# Patient Record
Sex: MALE | Race: WHITE | NOT HISPANIC OR LATINO | Employment: FULL TIME | URBAN - METROPOLITAN AREA
[De-identification: names, ages, dates, MRNs, and addresses within clinical notes are randomized per-mention and may not be internally consistent; named-entity substitution may affect disease eponyms.]

---

## 2018-08-13 ENCOUNTER — TELEPHONE (OUTPATIENT)
Dept: DERMATOLOGY | Facility: CLINIC | Age: 28
End: 2018-08-13

## 2018-08-13 NOTE — TELEPHONE ENCOUNTER
Dermatology Pre-visit Call:    Reason for visit : Eczema and rash     Any personal history of skin cancers: No    Was the patient referred: No    If the patient was referred, are records obtained: No. (If no, then obtain records).    Has the patient seen a dermatologist in the past: Yes. (If yes, obtain records)     Patient Reminders Given:  --Please, make sure you bring an updated list of your medications.   --Plan on being in our facility for approximately one hour, this includes the registration process, office visit, education and check-out process.  If you are having a procedure, more time may be required.     --If you are having a procedure, please, present 15 minutes early.  --Location reviewed.   --If you need to cancel or reschedule, call [phone]  --We look forward to seeing you in Dermatology Clinic.

## 2018-08-20 ENCOUNTER — OFFICE VISIT (OUTPATIENT)
Dept: DERMATOLOGY | Facility: CLINIC | Age: 28
End: 2018-08-20
Payer: COMMERCIAL

## 2018-08-20 DIAGNOSIS — L30.1 DYSHIDROTIC ECZEMA: ICD-10-CM

## 2018-08-20 DIAGNOSIS — L73.8 BACTERIAL FOLLICULITIS: Primary | ICD-10-CM

## 2018-08-20 RX ORDER — CLINDAMYCIN PHOSPHATE 10 UG/ML
LOTION TOPICAL DAILY
Qty: 60 ML | Refills: 3 | Status: SHIPPED | OUTPATIENT
Start: 2018-08-20 | End: 2018-10-08

## 2018-08-20 ASSESSMENT — PAIN SCALES - GENERAL: PAINLEVEL: NO PAIN (0)

## 2018-08-20 NOTE — LETTER
8/20/2018       RE: Johnson Ayon  [address]  [place] [facility] [address]     Dear Colleague,    Thank you for referring your patient, Johnson Ayon, to the [facility] DERMATOLOGY at [facility]. Please see a copy of my visit note below.    [facility] Dermatology Note      Dermatology Problem List:  1. Dyshidrotic eczema, hands bilaterally  - bleach baths 2-3 times per week, emollients    2. Folliculitis, upper chest and upper back  - bleach baths 2-3 times per week, clindamycin 1% lotion daily    Encounter Date: Aug 20, 2018    CC:  Chief Complaint   Patient presents with     Eczema     Johnson is here today for Eczema all over his body.        History of Present Illness:  Mr. Johnson Ayon is a 27 year old male who presents in self referral for eczema.    The patient reports that he has always had dry skin. Since moving here from [place] 10 years ago, the frankel have been more hard on his skin. Since age 19, he has had red bumps across the chest and upper back. In the past few years, the rash has been worsening on his wrists and hands with dry open patches. He has been diagnosed with atopic dermatitis, seasonal allergies, and asthma, and this also runs in his family.     The rash is very itchy. His skin care regimen is showering twice daily with Aveeno body wash and uses E45 (from the [place]) head to toe afterwards as an emollient. Also CeraVe hand cream for the hands. He has tried topical corticosteroids in the past for flares but tries to be judicious; last use was last winter. He has a 3 month old son at home and recently moved back from the [place] (lived there for 2 years). Johnson denies a personal or family history of skin cancer. He last saw a dermatologist a few years ago.    Past Medical History:   There is no problem list on file for this patient.    No past medical history on file.  No past surgical history on file.    Social  History:  Social History     Social History     Marital status:      Spouse name: N/A     Number of children: N/A     Years of education: N/A     Social History Main Topics     Smoking status: Not on file     Smokeless tobacco: Not on file     Alcohol use Not on file     Drug use: Not on file     Sexual activity: Not on file     Other Topics Concern     Not on file     Social History Narrative       Family History:  No family history on file.    Medications:  No current outpatient prescriptions on file.     Allergies   Allergen Reactions     Peanuts [Nuts] GI Disturbance   Review of Systems:  -As per HPI  -Constitutional: The patient denies fatigue, fevers, chills, unintended weight loss, and night sweats.  -HEENT: Patient denies nonhealing oral sores.  -Skin: As above in HPI. No additional skin concerns.    Physical exam:  Vitals: There were no vitals taken for this visit.  GEN: This is a well developed, well-nourished male in no acute distress, in a pleasant mood.    SKIN: Total skin excluding the undergarment areas was performed. The exam included the head/face, neck, both arms, chest, back, abdomen, both legs, digits and/or nails.   - Excoriated follicularly pink papules on chest and upper back  - Eczematous scaly papules on dorsal hands   - Vesicles on lateral fingers  - No other lesions of concern on areas examined    Impression/Plan:  1. Atopic dermatitis and dyshidrotic eczema, hands bilaterally    Counseled patient on diagnosis, etiology, disease course, and treatment options    Treatment options include light therapy with NB-UVB, topical corticosteroids, dupilumab, cyclosporine, methotrexate, and azathioprine    Start bleach baths 2-3 times per week    Bring old corticosteroids from home to follow up appointment    2. Folliculitis, upper chest and upper back    Discussed that it is a tricky balance between washing off sweat and keeping the folliculitis under control vs not drying out the skin and  exacerbating his atopic dermatitis    Counseled patient on diagnosis, etiology, disease course, and treatment options    Treatment options include bleach baths and benzoyl peroxide wash    Start bleach baths 2-3 times per week    Start clindamycin 1% lotion daily    Follow-up in 3 months, earlier for new or changing lesions.     Staff Involved:  Scribed by Sol Mckenzie, MS4 for Dr. Brown.    .I was present with the medical student who participated in the service and in the documentation of the note. I have verified the history and personally performed the physical exam and medical decision making. I agree with the assessment and plan of care as documented in the note.      Again, thank you for allowing me to participate in the care of your patient.      Sincerely,    Genevieve Brown MD

## 2018-08-20 NOTE — MR AVS SNAPSHOT
"              After Visit Summary   8/20/2018    Johnson Ayon    MRN: [id number]           Patient Information     Date Of Birth          1990        Visit Information        Provider Department      8/20/2018 8:15 AM Genevieve Brown MD [facility] Dermatology        Today's Diagnoses     Bacterial folliculitis    -  1    Dyshidrotic eczema          Care Instructions      Start bleach baths 2-3 times per week (instructions below)    Start clindamycin 1% lotion daily    Bring corticosteroids from home to follow up appointment    Follow up in 3 months      What are dilute bleach baths?  Dilute bleach baths are used to help fight bacteria that is commonly found on the skin; this bacteria may be preventing your skin from healing. If is also used to calm inflammation in skin, even if infection is not present. The dilution ratio we recommend is the same concentration that is in a swimming pool.     Type;  *Regular, plain household bleach used for cleaning clothing. Brand or Generic is okay.   *Make sure this is plain or concentrated bleach. This should NOT be \"splash free, splash less or color safe.\"   *There should not be any added fragrance to the bleach; such a lavender.    How do I make a dilute bleach bath?  *Fill your tub with lukewarm water with at least 4-6 inches of water.  *Pour 1/4 to 1/2 cup of bleach into an adult size bath tub.  *For smaller tubs (infant tubs), add two tablespoons of bleach to the tub water. * Bleach baths work better if your child is able to submerge most of their skin, so consider placing the infant tub in the larger tub.   *Repeat bleach baths as recommended by your provider.    Other information:  *Do not pour bleach directly onto the skin.  *If is safe to get the bleach mixture on your face and scalp.  *Do not drink the bleach mixture.  *Keep bleach bottle out of reach of children.              Follow-ups after your visit        Follow-up notes from your care team     " Return in about 3 months (around 2018) for eczema and folliculitis follow up.      Your next 10 appointments already scheduled     2018 10:00 AM CST   (Arrive by 9:45 AM)   Return Visit with Genevieve Brown MD   [facility] Dermatology ([facility])    [address] [address] [phone]              Who to contact     Please call your clinic at [phone] to:    Ask questions about your health    Make or cancel appointments    Discuss your medicines    Learn about your test results    Speak to your doctor            Additional Information About Your Visit        MyChart Information     Ref[employer]rlhart is an electronic gateway that provides easy, online access to your medical records. With MODLOFTt, you can request a clinic appointment, read your test results, renew a prescription or communicate with your care team.     To sign up for MODLOFTt visit the website at [email or web address]   You will be asked to enter the access code listed below, as well as some personal information. Please follow the directions to create your username and password.     Your access code is: [email or web address]  Expires: 2018  6:30 AM     Your access code will  in 90 days. If you need help or a new code, please contact your [facility] or call [phone] for assistance.        Care EveryWhere ID     This is your Care EveryWhere ID. This could be used by other organizations to access your [facility] medical records  [id number]         Blood Pressure from Last 3 Encounters:   No data found for BP    Weight from Last 3 Encounters:   No data found for Wt              Today, you had the following     No orders found for display       Primary Care Provider Fax #    Physician No Ref-Primary [phone]       No address on file        Equal Access to Services     ZACH SANCHEZ : linda Venegas qaybta  callie gómezbruno gracia ah. So [facility] [phone].    ATENCIÓN: Si habla jenn, tiene a arnold disposición servicios gratuitos de asistencia lingüística. Llame al [phone].    We comply with applicable federal civil rights laws and [place] laws. We do not discriminate on the basis of race, color, national origin, age, disability, sex, sexual orientation, or gender identity.            Thank you!     Thank you for choosing [facility] DERMATOLOGY  for your care. Our goal is always to provide you with excellent care. Hearing back from our patients is one way we can continue to improve our services. Please take a few minutes to complete the written survey that you may receive in the mail after your visit with us. Thank you!             Your Updated Medication List - Protect others around you: Learn how to safely use, store and throw away your medicines at www.disposemymeds.org.          This list is accurate as of 8/20/18  8:46 AM.  Always use your most recent med list.                   Brand Name Dispense Instructions for use Diagnosis    ALBUTEROL IN

## 2018-08-20 NOTE — PROGRESS NOTES
[facility] Dermatology Note      Dermatology Problem List:  1. Dyshidrotic eczema, hands bilaterally  - bleach baths 2-3 times per week, emollients    2. Folliculitis, upper chest and upper back  - bleach baths 2-3 times per week, clindamycin 1% lotion daily    Encounter Date: Aug 20, 2018    CC:  Chief Complaint   Patient presents with     Eczema     Johnson is here today for Eczema all over his body.        History of Present Illness:  Mr. Johnson Ayon is a 27 year old male who presents in self referral for eczema.    The patient reports that he has always had dry skin. Since moving here from [place] 10 years ago, the [place] have been more hard on his skin. Since age 19, he has had red bumps across the chest and upper back. In the past few years, the rash has been worsening on his wrists and hands with dry open patches. He has been diagnosed with atopic dermatitis, seasonal allergies, and asthma, and this also runs in his family.     The rash is very itchy. His skin care regimen is showering twice daily with Aveeno body wash and uses E45 (from the [place]) head to toe afterwards as an emollient. Also CeraVe hand cream for the hands. He has tried topical corticosteroids in the past for flares but tries to be judicious; last use was last winter. He has a 3 month old son at home and recently moved back from the  (lived there for 2 years). Johnson denies a personal or family history of skin cancer. He last saw a dermatologist a few years ago.    Past Medical History:   There is no problem list on file for this patient.    No past medical history on file.  No past surgical history on file.    Social History:  Social History     Social History     Marital status:      Spouse name: N/A     Number of children: N/A     Years of education: N/A     Social History Main Topics     Smoking status: Not on file     Smokeless tobacco: Not on file     Alcohol use Not on file     Drug use: Not on file      Sexual activity: Not on file     Other Topics Concern     Not on file     Social History Narrative       Family History:  No family history on file.    Medications:  No current outpatient prescriptions on file.     Allergies   Allergen Reactions     Peanuts [Nuts] GI Disturbance         Review of Systems:  -As per HPI  -Constitutional: The patient denies fatigue, fevers, chills, unintended weight loss, and night sweats.  -HEENT: Patient denies nonhealing oral sores.  -Skin: As above in HPI. No additional skin concerns.    Physical exam:  Vitals: There were no vitals taken for this visit.  GEN: This is a well developed, well-nourished male in no acute distress, in a pleasant mood.    SKIN: Total skin excluding the undergarment areas was performed. The exam included the head/face, neck, both arms, chest, back, abdomen, both legs, digits and/or nails.   - Excoriated follicularly pink papules on chest and upper back  - Eczematous scaly papules on dorsal hands   - Vesicles on lateral fingers  - No other lesions of concern on areas examined    Impression/Plan:  1. Atopic dermatitis and dyshidrotic eczema, hands bilaterally    Counseled patient on diagnosis, etiology, disease course, and treatment options    Treatment options include light therapy with NB-UVB, topical corticosteroids, dupilumab, cyclosporine, methotrexate, and azathioprine    Start bleach baths 2-3 times per week    Bring old corticosteroids from home to follow up appointment    2. Folliculitis, upper chest and upper back    Discussed that it is a tricky balance between washing off sweat and keeping the folliculitis under control vs not drying out the skin and exacerbating his atopic dermatitis    Counseled patient on diagnosis, etiology, disease course, and treatment options    Treatment options include bleach baths and benzoyl peroxide wash    Start bleach baths 2-3 times per week    Start clindamycin 1% lotion daily      Follow-up in 3 months,  earlier for new or changing lesions.     Staff Involved:  Scribed by Sol Mckenzie, MS4 for Dr. Brown.    .I was present with the medical student who participated in the service and in the documentation of the note. I have verified the history and personally performed the physical exam and medical decision making. I agree with the assessment and plan of care as documented in the note.  Genevieve Brown MD

## 2018-08-20 NOTE — PATIENT INSTRUCTIONS
"  Start bleach baths 2-3 times per week (instructions below)    Start clindamycin 1% lotion daily    Bring corticosteroids from home to follow up appointment    Follow up in 3 months      What are dilute bleach baths?  Dilute bleach baths are used to help fight bacteria that is commonly found on the skin; this bacteria may be preventing your skin from healing. If is also used to calm inflammation in skin, even if infection is not present. The dilution ratio we recommend is the same concentration that is in a swimming pool.     Type;  *Regular, plain household bleach used for cleaning clothing. Brand or Generic is okay.   *Make sure this is plain or concentrated bleach. This should NOT be \"splash free, splash less or color safe.\"   *There should not be any added fragrance to the bleach; such a lavender.    How do I make a dilute bleach bath?  *Fill your tub with lukewarm water with at least 4-6 inches of water.  *Pour 1/4 to 1/2 cup of bleach into an adult size bath tub.  *For smaller tubs (infant tubs), add two tablespoons of bleach to the tub water. * Bleach baths work better if your child is able to submerge most of their skin, so consider placing the infant tub in the larger tub.   *Repeat bleach baths as recommended by your provider.    Other information:  *Do not pour bleach directly onto the skin.  *If is safe to get the bleach mixture on your face and scalp.  *Do not drink the bleach mixture.  *Keep bleach bottle out of reach of children.      "

## 2018-08-20 NOTE — NURSING NOTE
Dermatology Rooming Note    Johnson Ayon's goals for this visit include:   Chief Complaint   Patient presents with     Eczema     Johnson is here today for Eczema all over his body.      MARLEE Carver

## 2018-10-08 ENCOUNTER — OFFICE VISIT (OUTPATIENT)
Dept: FAMILY MEDICINE | Facility: CLINIC | Age: 28
End: 2018-10-08
Payer: COMMERCIAL

## 2018-10-08 VITALS
RESPIRATION RATE: 16 BRPM | DIASTOLIC BLOOD PRESSURE: 74 MMHG | BODY MASS INDEX: 30.92 KG/M2 | OXYGEN SATURATION: 97 % | WEIGHT: 216 LBS | HEIGHT: 70 IN | SYSTOLIC BLOOD PRESSURE: 136 MMHG | TEMPERATURE: 98.1 F | HEART RATE: 66 BPM

## 2018-10-08 DIAGNOSIS — Z23 NEED FOR VACCINATION: ICD-10-CM

## 2018-10-08 DIAGNOSIS — J45.40 MODERATE PERSISTENT ASTHMA WITHOUT COMPLICATION: Primary | ICD-10-CM

## 2018-10-08 PROCEDURE — 99203 OFFICE O/P NEW LOW 30 MIN: CPT | Mod: 25 | Performed by: FAMILY MEDICINE

## 2018-10-08 PROCEDURE — 90686 IIV4 VACC NO PRSV 0.5 ML IM: CPT | Performed by: FAMILY MEDICINE

## 2018-10-08 PROCEDURE — 90471 IMMUNIZATION ADMIN: CPT | Performed by: FAMILY MEDICINE

## 2018-10-08 RX ORDER — FLUTICASONE PROPIONATE AND SALMETEROL 113; 14 UG/1; UG/1
1 POWDER, METERED RESPIRATORY (INHALATION) 2 TIMES DAILY
Qty: 3 INHALER | Refills: 3 | Status: SHIPPED | OUTPATIENT
Start: 2018-10-08 | End: 2018-12-31

## 2018-10-08 RX ORDER — CLINDAMYCIN PHOSPHATE 10 UG/ML
LOTION TOPICAL 2 TIMES DAILY
COMMUNITY
End: 2018-12-18

## 2018-10-08 RX ORDER — ALBUTEROL SULFATE 90 UG/1
2 AEROSOL, METERED RESPIRATORY (INHALATION) EVERY 6 HOURS PRN
Qty: 1 INHALER | Refills: 1 | Status: SHIPPED | OUTPATIENT
Start: 2018-10-08 | End: 2018-12-31

## 2018-10-08 NOTE — LETTER
My Asthma Action Plan  Name: Johnson Ayon   YOB: 1990  Date: 10/8/2018   My doctor: Bob Emanuel MD   My clinic: [facility]        My Control Medicine: airduo  My Rescue Medicine: Albuterol (Proair/Ventolin/Proventil) inhaler prn   My Asthma Severity: moderate persistent  Avoid your asthma triggers: upper respiratory infections  dust mites            GREEN ZONE   Good Control    I feel good    No cough or wheeze    Can work, sleep and play without asthma symptoms       Take your asthma control medicine every day.     1. If exercise triggers your asthma, take your rescue medication    15 minutes before exercise or sports, and    During exercise if you have asthma symptoms  2. Spacer to use with inhaler: If you have a spacer, make sure to use it with your inhaler             YELLOW ZONE Getting Worse  I have ANY of these:    I do not feel good    Cough or wheeze    Chest feels tight    Wake up at night   1. Keep taking your Green Zone medications  2. Start taking your rescue medicine:    every 20 minutes for up to 1 hour. Then every 4 hours for 24-48 hours.  3. If you stay in the Yellow Zone for more than 12-24 hours, contact your doctor.  4. If you do not return to the Green Zone in 12-24 hours or you get worse, start taking your oral steroid medicine if prescribed by your provider.           RED ZONE Medical Alert - Get Help  I have ANY of these:    I feel awful    Medicine is not helping    Breathing getting harder    Trouble walking or talking    Nose opens wide to breathe       1. Take your rescue medicine NOW  2. If your provider has prescribed an oral steroid medicine, start taking it NOW  3. Call your doctor NOW  4. If you are still in the Red Zone after 20 minutes and you have not reached your doctor:    Take your rescue medicine again and    Call 911 or go to the emergency room right away    See your regular doctor within 2 weeks of an Emergency Room or Urgent  Care visit for follow-up treatment.          Annual Reminders:  Meet with Asthma Educator,  Flu Shot in the Fall, consider Pneumonia Vaccination for patients with asthma (aged 19 and older).    Pharmacy: [facility]Y [id number] [place] [phone] [place]                      Asthma Triggers  How To Control Things That Make Your Asthma Worse    Triggers are things that make your asthma worse.  Look at the list below to help you find your triggers and what you can do about them.  You can help prevent asthma flare-ups by staying away from your triggers.      Trigger                                                          What you can do   Cigarette Smoke  Tobacco smoke can make asthma worse. Do not allow smoking in your home, car or around you.  Be sure no one smokes at a child s day care or school.  If you smoke, ask your health care provider for ways to help you quit.  Ask family members to quit too.  Ask your health care provider for a referral to Quit Plan to help you quit smoking, or call 5-269-343-PLAN.     Colds, Flu, Bronchitis  These are common triggers of asthma. Wash your hands often.  Don t touch your eyes, nose or mouth.  Get a flu shot every year.     Dust Mites  These are tiny bugs that live in cloth or carpet. They are too small to see. Wash sheets and blankets in hot water every week.   Encase pillows and mattress in dust mite proof covers.  Avoid having carpet if you can. If you have carpet, vacuum weekly.   Use a dust mask and HEPA vacuum.   Pollen and Outdoor Mold  Some people are allergic to trees, grass, or weed pollen, or molds. Try to keep your windows closed.  Limit time out doors when pollen count is high.   Ask you health care provider about taking medicine during allergy season.     Animal Dander  Some people are allergic to skin flakes, urine or saliva from pets with fur or feathers. Keep pets with fur or feathers out of your home.    If you can t keep the pet outdoors, then keep the pet  out of your bedroom.  Keep the bedroom door closed.  Keep pets off cloth furniture and away from stuffed toys.     Mice, Rats, and Cockroaches  Some people are allergic to the waste from these pests.   Cover food and garbage.  Clean up spills and food crumbs.  Store grease in the refrigerator.   Keep food out of the bedroom.   Indoor Mold  This can be a trigger if your home has high moisture. Fix leaking faucets, pipes, or other sources of water.   Clean moldy surfaces.  Dehumidify basement if it is damp and smelly.   Smoke, Strong Odors, and Sprays  These can reduce air quality. Stay away from strong odors and sprays, such as perfume, powder, hair spray, paints, smoke incense, paint, cleaning products, candles and new carpet.   Exercise or Sports  Some people with asthma have this trigger. Be active!  Ask your doctor about taking medicine before sports or exercise to prevent symptoms.    Warm up for 5-10 minutes before and after sports or exercise.     Other Triggers of Asthma  Cold air:  Cover your nose and mouth with a scarf.  Sometimes laughing or crying can be a trigger.  Some medicines and food can trigger asthma.

## 2018-10-08 NOTE — NURSING NOTE
Screening Questionnaire for Adult Immunization    Are you sick today?   No   Do you have allergies to medications, food, a vaccine component or latex?   No   Have you ever had a serious reaction after receiving a vaccination?   No   Do you have a long-term health problem with heart disease, lung disease, asthma, kidney disease, metabolic disease (e.g. diabetes), anemia, or other blood disorder?   No   Do you have cancer, leukemia, HIV/AIDS, or any other immune system problem?   No   In the past 3 months, have you taken medications that affect  your immune system, such as prednisone, other steroids, or anticancer drugs; drugs for the treatment of rheumatoid arthritis, Crohn s disease, or psoriasis; or have you had radiation treatments?   No   Have you had a seizure, or a brain or other nervous system problem?   No   During the past year, have you received a transfusion of blood or blood     products, or been given immune (gamma) globulin or antiviral drug?   No   For women: Are you pregnant or is there a chance you could become        pregnant during the next month?   No   Have you received any vaccinations in the past 4 weeks?   No     Immunization questionnaire answers were all negative.      Prior to injection verified patient identity using patient's name and date of birth.  Due to injection administration, patient instructed to remain in clinic for 15 minutes  afterwards, and to report any adverse reaction to me immediately.      Per orders of Dr. Emanuel, injection of Flu given by Francine Daugherty. Patient instructed to remain in clinic for 15 minutes afterwards, and to report any adverse reaction to me immediately.       Screening performed by Francine Daugherty on 10/8/2018 at 10:56 AM.

## 2018-10-08 NOTE — PROGRESS NOTES
"  SUBJECTIVE:   Johnson Ayon is a 28 year old male who presents to clinic today for the following health issues:      Asthma Follow-Up    Was ACT completed today?  Yes  No flowsheet data found.    Recent asthma triggers that patient is dealing with: dust mites, pollen, and weather          Amount of exercise or physical activity: 6-7 days/week for an average of 30-45 minutes    Problems taking medications regularly: No    Medication side effects: none    Diet: no peanut products     Was on advir for years  Was in the [place] for 2 years and on an alternative inh steroid but ran out    Now has a cold and symptoms worsening    See ACT    ROS: As per HPI.  Constitutional: no recent illness, no fevers/sweats/chills  CV: no palpitations, no chest pain, no lower extremity swelling.  Resp: + wheezing, or cough.    I have reviewed and updated the patient's past medical history in the EMR. Current problems are:    There is no problem list on file for this patient.    Past Surgical History:   Procedure Laterality Date     SINUS SURGERY  2007       Social History   Substance Use Topics     Smoking status: Never Smoker     Smokeless tobacco: Never Used     Alcohol use Yes      Comment: moderate      Family History   Problem Relation Age of Onset     Asthma Father      Asthma Brother      Asthma Sister      Family History Negative Mother      Melanoma No family hx of      Skin Cancer No family hx of          Allergies, reviewed:     Allergies   Allergen Reactions     Peanuts [Nuts] GI Disturbance       Current Outpatient Prescriptions   Medication Sig Dispense Refill     clindamycin (CLEOCIN T) 1 % lotion Apply topically 2 times daily       fluticasone-salmeterol (ADVAIR) 100-50 MCG/DOSE diskus inhaler Inhale 1 puff into the lungs every 12 hours         Objective:  BP (!) 155/99  Pulse 66  Temp 98.1  F (36.7  C) (Oral)  Resp 16  Ht 5' 10\" (1.778 m)  Wt 216 lb (98 kg)  SpO2 97%  BMI 30.99 kg/m2  General Appearance: " Pleasant, alert, WN/WD in no acute respiratory distress.  Head Exam: Normal. Normocephalic, atraumatic.    Eye Exam: Normal external eye, conjunctiva, lids. KEV.  Ear Exam: Normal auditory canals and external ears.    OroPharynx Exam: Dental hygiene adequate.    Neck Exam: Supple   Chest/Respiratory Exam: wheezing through with cough  Cardiovascular Exam: Regular rate and rhythm. No murmur, gallop, or rubs. No pedal edema.  Skin: no rash, warm and dry.  Neurologic Exam: Nonfocal, no tremor. Normal gait.  Psychiatric Exam: Alert - appropriate, normal affect    ASSESSMENT/PLAN:    ICD-10-CM    1. Moderate persistent asthma without complication J45.40 fluticasone-salmeterol (AIRDUO RESPICLICK) 113-14 MCG/ACT inhaler     albuterol (PROAIR HFA/PROVENTIL HFA/VENTOLIN HFA) 108 (90 Base) MCG/ACT inhaler   2. Need for vaccination Z23 HC FLU VAC PRESRV FREE QUAD SPLIT VIR 3+YRS IM      New patient establish care for asthma, mild exacerbation due to losing medication coverage  Start with airduo  Follow up: Return as needed if symptoms fail to improve     Else in 1 year    Bob Emanuel MD MPH

## 2018-10-08 NOTE — MR AVS SNAPSHOT
After Visit Summary   10/8/2018    Johnson Ayon    MRN: [id number]           Patient Information     Date Of Birth          1990        Visit Information        Provider Department      10/8/2018 10:30 AM Bob Emanuel MD [facility]        Today's Diagnoses     Moderate persistent asthma without complication    -  1    Need for vaccination           Follow-ups after your visit        Follow-up notes from your care team     Return in about 1 year (around 10/8/2019), or if symptoms worsen or fail to improve.      Your next 10 appointments already scheduled     Nov 13, 2018 10:00 AM CST   (Arrive by 9:45 AM)   Return Visit with Genevieve Brown MD   [facility] ([facility])    [address] [place] [id number] [phone]              Who to contact     If you have questions or need follow up information about today's clinic visit or your schedule please contact [facility] directly at [phone].  Normal or non-critical lab and imaging results will be communicated to you by MyChart, letter or phone within 4 business days after the clinic has received the results. If you do not hear from us within 7 days, please contact the clinic through [employer] or phone. If you have a critical or abnormal lab result, we will notify you by phone as soon as possible.  Submit refill requests through [employer] or call your pharmacy and they will forward the refill request to us. Please allow 3 business days for your refill to be completed.          Additional Information About Your Visit        MyChart Information     [employer] gives you secure access to your electronic health record. If you see a primary care provider, you can also send messages to your care team and make appointments. If you have questions, please call your primary care clinic.  If you do not have a primary care provider, please call  "[phone] and they will assist you.        Care EveryWhere ID     This is your Care EveryWhere ID. This could be used by other organizations to access your [place] medical records  [id number]        Your Vitals Were     Pulse Temperature Respirations Height Pulse Oximetry BMI (Body Mass Index)    66 98.1  F (36.7  C) (Oral) 16 5' 10\" (1.778 m) 97% 30.99 kg/m2       Blood Pressure from Last 3 Encounters:   10/08/18 136/74    Weight from Last 3 Encounters:   10/08/18 216 lb (98 kg)              We Performed the Following     HC FLU VAC PRESRV FREE QUAD SPLIT VIR 3+YRS IM     VACCINE ADMINISTRATION, INITIAL          Today's Medication Changes          These changes are accurate as of 10/8/18 11:13 AM.  If you have any questions, ask your nurse or doctor.               Start taking these medicines.        Dose/Directions    albuterol 108 (90 Base) MCG/ACT inhaler   Commonly known as:  PROAIR HFA/PROVENTIL HFA/VENTOLIN HFA   Used for:  Moderate persistent asthma without complication   Started by:  Bob Emanuel MD        Dose:  2 puff   Inhale 2 puffs into the lungs every 6 hours as needed for shortness of breath / dyspnea or wheezing   Quantity:  1 Inhaler   Refills:  1       fluticasone-salmeterol 113-14 MCG/ACT inhaler   Commonly known as:  AIRDUO RESPICLICK   Used for:  Moderate persistent asthma without complication   Replaces:  fluticasone-salmeterol 100-50 MCG/DOSE diskus inhaler   Started by:  Bob Emanuel MD        Dose:  1 puff   Inhale 1 puff into the lungs 2 times daily   Quantity:  3 Inhaler   Refills:  3         These medicines have changed or have updated prescriptions.        Dose/Directions    clindamycin 1 % lotion   Commonly known as:  CLEOCIN T   This may have changed:  Another medication with the same name was removed. Continue taking this medication, and follow the directions you see here.   Changed by:  Bob Emanuel MD        Apply topically 2 times daily "   Refills:  0         Stop taking these medicines if you haven't already. Please contact your care team if you have questions.     ALBUTEROL IN   Stopped by:  Bob Emanuel MD           fluticasone-salmeterol 100-50 MCG/DOSE diskus inhaler   Commonly known as:  ADVAIR   Replaced by:  fluticasone-salmeterol 113-14 MCG/ACT inhaler   Stopped by:  Bob Emanuel MD                Where to get your medicines      These medications were sent to [facility]/pharmacy #[id number] - [place], [place] - [address]  [address] [facility] [address]     Phone:  [phone]     albuterol 108 (90 Base) MCG/ACT inhaler    fluticasone-salmeterol 113-14 MCG/ACT inhaler                Primary Care Provider Office Phone # Fax #    [facility] [phone] [address]  [facility] [address]        Equal Access to Services     [facility]: Hadii aad ku hadasho Soomaali, waaxda luqadaha, qaybta kaalmada adeegyada, waxay idiin haymonican dontae gracia . So [facility] [phone].    ATENCIÓN: Si habla español, tiene a arnold disposición servicios gratuitos de asistencia lingüística. Llame al [phone].    We comply with applicable federal civil rights laws and [place] laws. We do not discriminate on the basis of race, color, national origin, age, disability, sex, sexual orientation, or gender identity.            Thank you!     Thank you for choosing [facility]  for your care. Our goal is always to provide you with excellent care. Hearing back from our patients is one way we can continue to improve our services. Please take a few minutes to complete the written survey that you may receive in the mail after your visit with us. Thank you!             Your Updated Medication List - Protect others around you: Learn how to safely use, store and throw away your medicines at www.disposemymeds.org.          This list is accurate as of 10/8/18 11:13 AM.  Always use your most recent  med list.                   Brand Name Dispense Instructions for use Diagnosis    albuterol 108 (90 Base) MCG/ACT inhaler    PROAIR HFA/PROVENTIL HFA/VENTOLIN HFA    1 Inhaler    Inhale 2 puffs into the lungs every 6 hours as needed for shortness of breath / dyspnea or wheezing    Moderate persistent asthma without complication       clindamycin 1 % lotion    CLEOCIN T     Apply topically 2 times daily        fluticasone-salmeterol 113-14 MCG/ACT inhaler    AIRDUO RESPICLICK    3 Inhaler    Inhale 1 puff into the lungs 2 times daily    Moderate persistent asthma without complication

## 2018-10-08 NOTE — NURSING NOTE
"Chief Complaint   Patient presents with     Asthma     BP (!) 155/99  Pulse 66  Temp 98.1  F (36.7  C) (Oral)  Resp 16  Ht 5' 10\" (1.778 m)  Wt 216 lb (98 kg)  SpO2 97%  BMI 30.99 kg/m2 Estimated body mass index is 30.99 kg/(m^2) as calculated from the following:    Height as of this encounter: 5' 10\" (1.778 m).    Weight as of this encounter: 216 lb (98 kg).  bp completed using cuff size: regular       Health Maintenance addressed:  BP was high, used pink card, recheck manually    Possibly completing today per provider review.    Francine Daugherty MA     "

## 2018-11-13 ENCOUNTER — OFFICE VISIT (OUTPATIENT)
Dept: DERMATOLOGY | Facility: CLINIC | Age: 28
End: 2018-11-13
Payer: COMMERCIAL

## 2018-11-13 VITALS — SYSTOLIC BLOOD PRESSURE: 144 MMHG | DIASTOLIC BLOOD PRESSURE: 70 MMHG | HEART RATE: 60 BPM

## 2018-11-13 DIAGNOSIS — L73.9 FOLLICULITIS: ICD-10-CM

## 2018-11-13 DIAGNOSIS — L30.1 DYSHIDROTIC ECZEMA: Primary | ICD-10-CM

## 2018-11-13 ASSESSMENT — PAIN SCALES - GENERAL: PAINLEVEL: NO PAIN (0)

## 2018-11-13 NOTE — PROGRESS NOTES
[facility] Dermatology Note      Dermatology Problem List:  1. Dyshidrotic eczema  - Emollients, OTC hydrocortisone  - Patient to call/MyChart if worsening, would prescribe triamcinolone ointment  2. Folliculitis, upper chest and upper back  - Benzoyl peroxide wash, clindamycin 1% lotion daily    Encounter Date: Nov 13, 2018    CC:  Chief Complaint   Patient presents with     Derm Problem     Maria Guadalupe is here for a eczema follow up, states things have been improving.         History of Present Illness:  Mr. Johnson Ayon is a 28 year old male who presents in follow up for dyshidrotic hand dermatitis and folliculitis of the upper chest and upper back. He was last seen 8/20/18, at which time he was continued on dry skin care/emollients for his hand eczema and bleach baths and clindamycin 1% lotion were recommended for his folliculitis.   Today, patient reports that his hand eczema is almost completely clear. Using gentle cleanser and moisturizing frequently. For occasional flares, he uses OTC hydrocortisone 1% cream, which works well. He does not want a stronger steroid prescription at this time, but will call if he would like that in the future.   He also reports that his folliculitis is still present, but significantly improved. The bleach baths worked very well, however he recently moved to a new place that doesn't have a bathtub so he wonders if there are any alternatives. He is using the clindamycin 1% lotion daily with good results as well.   No new concerns today; denies any new, growing, changing, or symptomatic lesions.     Past Medical History:   Patient Active Problem List   Diagnosis     Moderate persistent asthma without complication     No past medical history on file.  Past Surgical History:   Procedure Laterality Date     SINUS SURGERY  2007       Social History:  Social History     Social History     Marital status:      Spouse name: N/A     Number of children: N/A      Years of education: N/A     Social History Main Topics     Smoking status: Not on file     Smokeless tobacco: Not on file     Alcohol use Not on file     Drug use: Not on file     Sexual activity: Not on file     Other Topics Concern     Not on file     Social History Narrative       Family History:  Family History   Problem Relation Age of Onset     Asthma Father      Asthma Brother      Asthma Sister      Family History Negative Mother      Melanoma No family hx of      Skin Cancer No family hx of        Medications:  Current Outpatient Prescriptions   Medication Sig Dispense Refill     albuterol (PROAIR HFA/PROVENTIL HFA/VENTOLIN HFA) 108 (90 Base) MCG/ACT inhaler Inhale 2 puffs into the lungs every 6 hours as needed for shortness of breath / dyspnea or wheezing 1 Inhaler 1     clindamycin (CLEOCIN T) 1 % lotion Apply topically 2 times daily       fluticasone-salmeterol (AIRDUO RESPICLICK) 113-14 MCG/ACT inhaler Inhale 1 puff into the lungs 2 times daily 3 Inhaler 3     Allergies   Allergen Reactions     Peanuts [Nuts] GI Disturbance         Review of Systems:  -General: Otherwise feeling well, in baseline state of general health.   -Skin: As above in HPI. No additional skin concerns.    Physical exam:  Vitals: /70 (BP Location: Right arm, Patient Position: Chair, Cuff Size: Adult Large)  Pulse 60  GEN: This is a well-developed, well-nourished male in no acute distress, in a pleasant mood.    SKIN: Focused examination of the chest, back, and bilateral distal arms/hands was performed:  - Wrists with limited, very thin, poorly demarcated eczematous plaques. Fingers are clear.   - Back and chest with scattered follicular pink papules - improved today.   - No other lesions of concern on areas examined    Impression/Plan:  1. Dyshidrotic hand eczema - well controlled with only dry skin care/emollients.     Continue dry skin care and frequent moisturization.     Using OTC hydrocortisone 1% as needed for flares  with good results.    Declined a prescription for a stronger steroid to have on hand to use as needed today, but states he will call or send a [employer] message if he would like this in the future. Will send triamcinolone 0.1% oint if requested.    2. Folliculitis, upper chest and upper back- significantly improved today.     Start benzoyl peroxide 5-10% wash a few times weekly as tolerated since he can no longer do bleach baths. This will also help prevent clindamycin resistance.     Continue clindamycin 1% lotion daily.     Follow-up in 6 months, earlier for new or changing lesions.     The patient was staffed with Dr. Brown.     Staff Involved:  Resident (Jodi Leblanc MD)/Staff(as above).  .I, Genevieve Brown MD, saw this patient with the resident and agree with the resident s findings and plan of care as documented in the resident s note.

## 2018-11-13 NOTE — LETTER
11/13/2018       RE: Johnson Ayon  [address]  [facility] [address]     Dear Colleague,    Thank you for referring your patient, Johnson Aoyn, to the [facility] DERMATOLOGY at [facility]. Please see a copy of my visit note below.    [facility] Dermatology Note      Dermatology Problem List:  1. Dyshidrotic eczema  - Emollients, OTC hydrocortisone  - Patient to call/MyChart if worsening, would prescribe triamcinolone ointment  2. Folliculitis, upper chest and upper back  - Benzoyl peroxide wash, clindamycin 1% lotion daily    Encounter Date: Nov 13, 2018    CC:  Chief Complaint   Patient presents with     Derm Problem     Maria Guadalupe is here for a eczema follow up, states things have been improving.         History of Present Illness:  Mr. Johnson Ayon is a 28 year old male who presents in follow up for dyshidrotic hand dermatitis and folliculitis of the upper chest and upper back. He was last seen 8/20/18, at which time he was continued on dry skin care/emollients for his hand eczema and bleach baths and clindamycin 1% lotion were recommended for his folliculitis.   Today, patient reports that his hand eczema is almost completely clear. Using gentle cleanser and moisturizing frequently. For occasional flares, he uses OTC hydrocortisone 1% cream, which works well. He does not want a stronger steroid prescription at this time, but will call if he would like that in the future.   He also reports that his folliculitis is still present, but significantly improved. The bleach baths worked very well, however he recently moved to a new place that doesn't have a bathtub so he wonders if there are any alternatives. He is using the clindamycin 1% lotion daily with good results as well.   No new concerns today; denies any new, growing, changing, or symptomatic lesions.     Past Medical History:   Patient Active Problem List   Diagnosis      Moderate persistent asthma without complication     No past medical history on file.  Past Surgical History:   Procedure Laterality Date     SINUS SURGERY  2007       Social History:  Social History     Social History     Marital status:      Spouse name: N/A     Number of children: N/A     Years of education: N/A     Social History Main Topics     Smoking status: Not on file     Smokeless tobacco: Not on file     Alcohol use Not on file     Drug use: Not on file     Sexual activity: Not on file     Other Topics Concern     Not on file     Social History Narrative       Family History:  Family History   Problem Relation Age of Onset     Asthma Father      Asthma Brother      Asthma Sister      Family History Negative Mother      Melanoma No family hx of      Skin Cancer No family hx of        Medications:  Current Outpatient Prescriptions   Medication Sig Dispense Refill     albuterol (PROAIR HFA/PROVENTIL HFA/VENTOLIN HFA) 108 (90 Base) MCG/ACT inhaler Inhale 2 puffs into the lungs every 6 hours as needed for shortness of breath / dyspnea or wheezing 1 Inhaler 1     clindamycin (CLEOCIN T) 1 % lotion Apply topically 2 times daily       fluticasone-salmeterol (AIRDUO RESPICLICK) 113-14 MCG/ACT inhaler Inhale 1 puff into the lungs 2 times daily 3 Inhaler 3     Allergies   Allergen Reactions     Peanuts [Nuts] GI Disturbance         Review of Systems:  -General: Otherwise feeling well, in baseline state of general health.   -Skin: As above in HPI. No additional skin concerns.    Physical exam:  Vitals: /70 (BP Location: Right arm, Patient Position: Chair, Cuff Size: Adult Large)  Pulse 60  GEN: This is a well-developed, well-nourished male in no acute distress, in a pleasant mood.    SKIN: Focused examination of the chest, back, and bilateral distal arms/hands was performed:  - Wrists with limited, very thin, poorly demarcated eczematous plaques. Fingers are clear.   - Back and chest with scattered  follicular pink papules - improved today.   - No other lesions of concern on areas examined    Impression/Plan:  1. Dyshidrotic hand eczema - well controlled with only dry skin care/emollients.     Continue dry skin care and frequent moisturization.     Using OTC hydrocortisone 1% as needed for flares with good results.    Declined a prescription for a stronger steroid to have on hand to use as needed today, but states he will call or send a [employer] message if he would like this in the future. Will send triamcinolone 0.1% oint if requested.    2. Folliculitis, upper chest and upper back- significantly improved today.     Start benzoyl peroxide 5-10% wash a few times weekly as tolerated since he can no longer do bleach baths. This will also help prevent clindamycin resistance.     Continue clindamycin 1% lotion daily.     Follow-up in 6 months, earlier for new or changing lesions.     The patient was staffed with Dr. Brown.     Staff Involved:  Resident (Jodi Leblanc MD)/Staff(as above).  .I, Genevieve Brown MD, saw this patient with the resident and agree with the resident s findings and plan of care as documented in the resident s note.      Again, thank you for allowing me to participate in the care of your patient.      Sincerely,    Genevieve Brown MD

## 2018-11-13 NOTE — PATIENT INSTRUCTIONS
Continue dry skin care for your hand/wrist eczema - gentle soaps and moisturizers as you have been doing. You can use over the counter hydrocortisone if needed for worsening. Please call us if you are having a severe flare or if that is not working, and we can prescribe a stronger steroid medication called triamcinolone.     For your folliculitis, continue clindamycin lotion daily. Since you can no longer take bleach baths, you should start using benzoyl peroxide wash 5-10% a few times per week as a body wash to the affected areas (leave on for a few minutes) to prevent clindamycin resistance. This is found over the counter. This can bleach linens, so be careful to use an older or white towel.     You can try Curel hydratherapy moisturizer in the shower if you like.     Return to clinic in 6 months, sooner as needed for worsening/concerns.

## 2018-11-13 NOTE — LETTER
Date:November 14, 2018      Patient was self referred, no letter generated. Do not send.        [facility] Information

## 2018-11-13 NOTE — MR AVS SNAPSHOT
After Visit Summary   11/13/2018    Johnson Ayon    MRN: [id number]           Patient Information     Date Of Birth          1990        Visit Information        Provider Department      11/13/2018 10:00 AM Genevieve Brown MD [facility] Dermatology        Care Instructions    Continue dry skin care for your hand/wrist eczema - gentle soaps and moisturizers as you have been doing. You can use over the counter hydrocortisone if needed for worsening. Please call us if you are having a severe flare or if that is not working, and we can prescribe a stronger steroid medication called triamcinolone.     For your folliculitis, continue clindamycin lotion daily. Since you can no longer take bleach baths, you should start using benzoyl peroxide wash 5-10% a few times per week as a body wash to the affected areas (leave on for a few minutes) to prevent clindamycin resistance. This is found over the counter. This can bleach linens, so be careful to use an older or white towel.     You can try Curel hydratherapy moisturizer in the shower if you like.     Return to clinic in 6 months, sooner as needed for worsening/concerns.           Follow-ups after your visit        Follow-up notes from your care team     Return in about 6 months (around 5/13/2019).      Who to contact     Please call your clinic at [phone] to:    Ask questions about your health    Make or cancel appointments    Discuss your medicines    Learn about your test results    Speak to your doctor            Additional Information About Your Visit        MyChart Information     [employer] gives you secure access to your electronic health record. If you see a primary care provider, you can also send messages to your care team and make appointments. If you have questions, please call your primary care clinic.  If you do not have a primary care provider, please call [phone] and they will assist you.      [employer] is an electronic  gateway that provides easy, online access to your medical records. With [employer], you can request a clinic appointment, read your test results, renew a prescription or communicate with your care team.     To access your existing account, please contact your [facility] or call [phone] for assistance.        Care EveryWhere ID     This is your Care EveryWhere ID. This could be used by other organizations to access your [facility] medical records  [id number]        Your Vitals Were     Pulse                   60            Blood Pressure from Last 3 Encounters:   11/13/18 144/70   10/08/18 136/74    Weight from Last 3 Encounters:   10/08/18 98 kg (216 lb)              Today, you had the following     No orders found for display       Primary Care Provider Office Phone # Fax #    [facility] [phone] [address]  [facility] [address]        Equal Access to Services     [facility] : Hadii aad ku hadasho Soomaali, waaxda luqadaha, qaybta kaalmada adeegyada, waxay idiin hayaan dontae gracia . So [facility] [phone].    ATENCIÓN: Si habla español, tiene a arnold disposición servicios gratuitos de asistencia lingüística. Llame al [phone].    We comply with applicable federal civil rights laws and [place] laws. We do not discriminate on the basis of race, color, national origin, age, disability, sex, sexual orientation, or gender identity.            Thank you!     Thank you for choosing [facility] DERMATOLOGY  for your care. Our goal is always to provide you with excellent care. Hearing back from our patients is one way we can continue to improve our services. Please take a few minutes to complete the written survey that you may receive in the mail after your visit with us. Thank you!             Your Updated Medication List - Protect others around you: Learn how to safely use, store and throw away your medicines at  www.disposemymeds.org.          This list is accurate as of 11/13/18 10:30 AM.  Always use your most recent med list.                   Brand Name Dispense Instructions for use Diagnosis    albuterol 108 (90 Base) MCG/ACT inhaler    PROAIR HFA/PROVENTIL HFA/VENTOLIN HFA    1 Inhaler    Inhale 2 puffs into the lungs every 6 hours as needed for shortness of breath / dyspnea or wheezing    Moderate persistent asthma without complication       clindamycin 1 % lotion    CLEOCIN T     Apply topically 2 times daily        fluticasone-salmeterol 113-14 MCG/ACT inhaler    AIRDUO RESPICLICK    3 Inhaler    Inhale 1 puff into the lungs 2 times daily    Moderate persistent asthma without complication

## 2018-11-13 NOTE — NURSING NOTE
Dermatology Rooming Note    Johnson Ayon's goals for this visit include:   Chief Complaint   Patient presents with     Derm Problem     Maria Guadalupe is here for a eczema follow up, states things have been improving.         Henrietta Shin LPN

## 2018-11-14 ENCOUNTER — MYC REFILL (OUTPATIENT)
Dept: FAMILY MEDICINE | Facility: CLINIC | Age: 28
End: 2018-11-14

## 2018-11-14 DIAGNOSIS — J45.40 MODERATE PERSISTENT ASTHMA WITHOUT COMPLICATION: ICD-10-CM

## 2018-11-14 RX ORDER — ALBUTEROL SULFATE 90 UG/1
2 AEROSOL, METERED RESPIRATORY (INHALATION) EVERY 6 HOURS PRN
Qty: 1 INHALER | Refills: 1 | Status: CANCELLED | OUTPATIENT
Start: 2018-11-14

## 2018-11-14 RX ORDER — FLUTICASONE PROPIONATE AND SALMETEROL 113; 14 UG/1; UG/1
1 POWDER, METERED RESPIRATORY (INHALATION) 2 TIMES DAILY
Qty: 3 INHALER | Refills: 3 | Status: CANCELLED | OUTPATIENT
Start: 2018-11-14

## 2018-11-14 NOTE — TELEPHONE ENCOUNTER
Message from MyChart:  Original authorizing provider: Bob Emanuel MD    Johnson GUILLERMOJeffrey McfarlandGabo would like a refill of the following medications:  fluticasone-salmeterol (AIRDUO RESPICLICK) 113-14 MCG/ACT inhaler [Bob Emanuel MD]  albuterol (PROAIR HFA/PROVENTIL HFA/VENTOLIN HFA) 108 (90 Base) MCG/ACT inhaler [Bob Emanuel MD]    Preferred pharmacy: [id number] - [place], [place] - [address]    Comment:

## 2018-11-14 NOTE — TELEPHONE ENCOUNTER
"Should have refills at different [facility]  See Ang MOCTEZUMA RN    Requested Prescriptions   Pending Prescriptions Disp Refills     fluticasone-salmeterol (AIRDUO RESPICLICK) 113-14 MCG/ACT inhaler 3 Inhaler 3     Sig: Inhale 1 puff into the lungs 2 times daily    Inhaled Steroids Protocol Failed    11/14/2018 11:41 AM       Failed - Asthma control assessment score within normal limits in last 6 months    Please review ACT score.          Passed - Patient is age 12 or older       Passed - Recent (6 mo) or future (30 days) visit within the authorizing provider's specialty    Patient had office visit in the last 6 months or has a visit in the next 30 days with authorizing provider or within the authorizing provider's specialty.  See \"Patient Info\" tab in inbasket, or \"Choose Columns\" in Meds & Orders section of the refill encounter.            albuterol (PROAIR HFA/PROVENTIL HFA/VENTOLIN HFA) 108 (90 Base) MCG/ACT inhaler 1 Inhaler 1     Sig: Inhale 2 puffs into the lungs every 6 hours as needed for shortness of breath / dyspnea or wheezing    Asthma Maintenance Inhalers - Anticholinergics Failed    11/14/2018 11:41 AM       Failed - Asthma control assessment score within normal limits in last 6 months    Please review ACT score.          Passed - Patient is age 12 years or older       Passed - Recent (6 mo) or future (30 days) visit within the authorizing provider's specialty    Patient had office visit in the last 6 months or has a visit in the next 30 days with authorizing provider or within the authorizing provider's specialty.  See \"Patient Info\" tab in inbasket, or \"Choose Columns\" in Meds & Orders section of the refill encounter.                "

## 2018-12-18 ENCOUNTER — OFFICE VISIT (OUTPATIENT)
Dept: FAMILY MEDICINE | Facility: CLINIC | Age: 28
End: 2018-12-18
Payer: COMMERCIAL

## 2018-12-18 ENCOUNTER — MYC REFILL (OUTPATIENT)
Dept: FAMILY MEDICINE | Facility: CLINIC | Age: 28
End: 2018-12-18

## 2018-12-18 VITALS
SYSTOLIC BLOOD PRESSURE: 156 MMHG | BODY MASS INDEX: 31.42 KG/M2 | OXYGEN SATURATION: 97 % | DIASTOLIC BLOOD PRESSURE: 69 MMHG | HEIGHT: 70 IN | HEART RATE: 82 BPM | RESPIRATION RATE: 16 BRPM | TEMPERATURE: 98.3 F | WEIGHT: 219.44 LBS

## 2018-12-18 DIAGNOSIS — F32.0 MILD MAJOR DEPRESSION (H): ICD-10-CM

## 2018-12-18 DIAGNOSIS — F32.0 MILD MAJOR DEPRESSION (H): Primary | ICD-10-CM

## 2018-12-18 PROCEDURE — 99214 OFFICE O/P EST MOD 30 MIN: CPT | Performed by: FAMILY MEDICINE

## 2018-12-18 RX ORDER — FLUOXETINE 10 MG/1
10 CAPSULE ORAL DAILY
Qty: 60 CAPSULE | Refills: 1 | Status: CANCELLED | OUTPATIENT
Start: 2018-12-18

## 2018-12-18 RX ORDER — FLUOXETINE 10 MG/1
10 CAPSULE ORAL DAILY
Qty: 60 CAPSULE | Refills: 1 | Status: SHIPPED | OUTPATIENT
Start: 2018-12-18 | End: 2020-11-20

## 2018-12-18 ASSESSMENT — PATIENT HEALTH QUESTIONNAIRE - PHQ9: SUM OF ALL RESPONSES TO PHQ QUESTIONS 1-9: 11

## 2018-12-18 ASSESSMENT — MIFFLIN-ST. JEOR: SCORE: 1966.61

## 2018-12-18 ASSESSMENT — PAIN SCALES - GENERAL: PAINLEVEL: NO PAIN (0)

## 2018-12-18 NOTE — PROGRESS NOTES
SUBJECTIVE:   Johnson Ayon is a 28 year old male who presents to clinic today for the following health issues:      Depression and Anxiety Follow-Up  He and his wife were living in the [place] for a while and he got off his antidepressant, was on Prozac in the past but has been off and doing well. They had some stressful events around the time of his wife labor and the birth of their son 6 months ago and starting then he began to get slightly depressed , which turned worse last couple of months.  He has started seeing a therapist and therapist suggested he gets back on medication. He does have anxiety as well.      Status since last visit: Worsened patient is seeing other therapist     Other associated symptoms:None    Complicating factors:     Significant life event: Yes-  Pass of patients son     Current substance abuse: None    No flowsheet data found.  No flowsheet data found.    PHQ-9  English  PHQ-9   Any Language  KELLY-7  Suicide Assessment Five-step Evaluation and Treatment (SAFE-T)    Amount of exercise or physical activity: patient stats he keeps himself active    Problems taking medications regularly: No    Medication side effects: none    Diet: regular (no restrictions)          Problem list and histories reviewed & adjusted, as indicated.  Additional history: as documented    Patient Active Problem List   Diagnosis     Moderate persistent asthma without complication     Mild major depression (H)     Past Surgical History:   Procedure Laterality Date     SINUS SURGERY  2007       Social History     Tobacco Use     Smoking status: Never Smoker     Smokeless tobacco: Never Used   Substance Use Topics     Alcohol use: Yes     Comment: moderate      Family History   Problem Relation Age of Onset     Asthma Father      Asthma Brother      Asthma Sister      Family History Negative Mother      Melanoma No family hx of      Skin Cancer No family hx of          Current Outpatient Medications   Medication Sig  "Dispense Refill     FLUoxetine (PROZAC) 10 MG capsule Take 1 capsule (10 mg) by mouth daily 60 capsule 1     albuterol (PROAIR HFA/PROVENTIL HFA/VENTOLIN HFA) 108 (90 Base) MCG/ACT inhaler Inhale 2 puffs into the lungs every 6 hours as needed for shortness of breath / dyspnea or wheezing 1 Inhaler 1     clindamycin (CLEOCIN T) 1 % external lotion Apply topically 2 times daily 120 mL 11     fluticasone-salmeterol (AIRDUO RESPICLICK) 113-14 MCG/ACT inhaler Inhale 1 puff into the lungs 2 times daily 3 Inhaler 3     Allergies   Allergen Reactions     Peanuts [Nuts] GI Disturbance     No lab results found.   BP Readings from Last 3 Encounters:   12/18/18 156/69   11/13/18 144/70   10/08/18 136/74    Wt Readings from Last 3 Encounters:   12/18/18 99.5 kg (219 lb 7 oz)   10/08/18 98 kg (216 lb)                  Labs reviewed in EPIC    Reviewed and updated as needed this visit by clinical staff       Reviewed and updated as needed this visit by Provider         ROS:  Constitutional, HEENT, cardiovascular, pulmonary, GI, , musculoskeletal, neuro, skin, endocrine and psych systems are negative, except as otherwise noted.    OBJECTIVE:     /69 (BP Location: Right arm, Patient Position: Sitting, Cuff Size: Adult Large)   Pulse 82   Temp 98.3  F (36.8  C) (Oral)   Resp 16   Ht 1.778 m (5' 10\")   Wt 99.5 kg (219 lb 7 oz)   SpO2 97%   BMI 31.49 kg/m    Body mass index is 31.49 kg/m .  GENERAL: healthy, alert and no distress  EYES: Eyes grossly normal to inspection, PERRL and conjunctivae and sclerae normal  NECK: no adenopathy, no asymmetry, masses, or scars and thyroid normal to palpation  RESP: lungs clear to auscultation - no rales, rhonchi or wheezes  CV: regular rate and rhythm, normal S1 S2, no S3 or S4, no murmur, click or rub, no peripheral edema and peripheral pulses strong  ABDOMEN: soft, nontender, no hepatosplenomegaly, no masses and bowel sounds normal  MS: no gross musculoskeletal defects noted, no " edema  NEURO: Normal strength and tone, mentation intact and speech normal    Diagnostic Test Results:  No results found for this or any previous visit.    ASSESSMENT/PLAN:       1. Mild major depression (H)  We discussed starting with one capsule daily for a week then going to two capsules daily and f/u here in clinic in 4 to 5 weeks, sooner if any concerns.  - FLUoxetine (PROZAC) 10 MG capsule; Take 1 capsule (10 mg) by mouth daily  Dispense: 60 capsule; Refill: 1    RTC if no improving or worsening.  Pt is aware  and comfortable with the current plan.    Ingrid Kincaid MD  [facility]

## 2018-12-19 ASSESSMENT — ASTHMA QUESTIONNAIRES: ACT_TOTALSCORE: 25

## 2018-12-19 NOTE — TELEPHONE ENCOUNTER
"Duplicate; Rx sent yesterday 12/18/2018  Sent MyChart to pt  Magui MOCTEZUMA RN    Requested Prescriptions   Pending Prescriptions Disp Refills     FLUoxetine (PROZAC) 10 MG capsule 60 capsule 1     Sig: Take 1 capsule (10 mg) by mouth daily    SSRIs Protocol Failed - 12/18/2018  3:43 PM       Failed - PHQ-9 score less than 5 in past 6 months    Please review last PHQ-9 score.          Passed - Patient is age 18 or older       Passed - Recent (6 mo) or future (30 days) visit within the authorizing provider's specialty    Patient had office visit in the last 6 months or has a visit in the next 30 days with authorizing provider or within the authorizing provider's specialty.  See \"Patient Info\" tab in inbasket, or \"Choose Columns\" in Meds & Orders section of the refill encounter.                "

## 2019-02-12 ENCOUNTER — OFFICE VISIT (OUTPATIENT)
Dept: FAMILY MEDICINE | Facility: CLINIC | Age: 29
End: 2019-02-12
Payer: COMMERCIAL

## 2019-02-12 VITALS
WEIGHT: 218 LBS | DIASTOLIC BLOOD PRESSURE: 81 MMHG | RESPIRATION RATE: 16 BRPM | SYSTOLIC BLOOD PRESSURE: 148 MMHG | OXYGEN SATURATION: 98 % | TEMPERATURE: 98.5 F | BODY MASS INDEX: 31.21 KG/M2 | HEART RATE: 56 BPM | HEIGHT: 70 IN

## 2019-02-12 DIAGNOSIS — F32.0 MILD MAJOR DEPRESSION (H): ICD-10-CM

## 2019-02-12 PROCEDURE — 99214 OFFICE O/P EST MOD 30 MIN: CPT | Performed by: FAMILY MEDICINE

## 2019-02-12 ASSESSMENT — PATIENT HEALTH QUESTIONNAIRE - PHQ9: SUM OF ALL RESPONSES TO PHQ QUESTIONS 1-9: 7

## 2019-02-12 ASSESSMENT — MIFFLIN-ST. JEOR: SCORE: 1960.09

## 2019-02-12 NOTE — NURSING NOTE
"Chief Complaint   Patient presents with     Depression     initial /81 (BP Location: Left arm, Cuff Size: Adult Large)   Pulse 56   Temp 98.5  F (36.9  C) (Oral)   Resp 16   Ht 1.778 m (5' 10\")   Wt 98.9 kg (218 lb)   SpO2 98%   BMI 31.28 kg/m   Estimated body mass index is 31.28 kg/m  as calculated from the following:    Height as of this encounter: 1.778 m (5' 10\").    Weight as of this encounter: 98.9 kg (218 lb).  BP completed using cuff size: large.  L  arm      Health Maintenance that is potentially due pending provider review:  NONE    n/a    Chuck Rucker ma  "

## 2019-02-12 NOTE — PROGRESS NOTES
SUBJECTIVE:   Johnson Ayon is a 28 year old male who presents to clinic today for the following health issues:      Refill prozac    Depression Followup    Status since last visit: Improved  But he was traveling and could not come for a f/u OV and ran out of the Prozac over a week ago , no withdrawal symptoms but states that last couple of weeks has felt somewhat down again , although not as bad as before but PHQ 9 score is 7 , was 11 on Dec 18 when I saw him . No side effects and was doing well on the 20 mg dose .    See PHQ-9 for current symptoms.  Other associated symptoms: None    Complicating factors:   Significant life event:  No   Current substance abuse:  None  Anxiety or Panic symptoms:  No    PHQ 12/18/2018 2/12/2019   PHQ-9 Total Score 11 7   Q9: Suicide Ideation Not at all Not at all       PHQ-9  English  PHQ-9   Any Language  Suicide Assessment Five-step Evaluation and Treatment (SAFE-T)    Problem list and histories reviewed & adjusted, as indicated.  Additional history: as documented    Patient Active Problem List   Diagnosis     Moderate persistent asthma without complication     Mild major depression (H)     Past Surgical History:   Procedure Laterality Date     SINUS SURGERY  2007       Social History     Tobacco Use     Smoking status: Never Smoker     Smokeless tobacco: Never Used   Substance Use Topics     Alcohol use: Yes     Comment: moderate      Family History   Problem Relation Age of Onset     Asthma Father      Asthma Brother      Asthma Sister      Family History Negative Mother      Melanoma No family hx of      Skin Cancer No family hx of          Current Outpatient Medications   Medication Sig Dispense Refill     FLUoxetine (PROZAC) 20 MG capsule Take 1 capsule (20 mg) by mouth daily 30 capsule 3     FLUoxetine (PROZAC) 20 MG capsule Take 1 capsule (20 mg) by mouth daily 90 capsule 0     albuterol (PROAIR HFA/PROVENTIL HFA/VENTOLIN HFA) 108 (90 Base) MCG/ACT inhaler Inhale 2  "puffs into the lungs every 6 hours as needed for shortness of breath / dyspnea or wheezing 1 Inhaler 1     clindamycin (CLEOCIN T) 1 % external lotion Apply topically 2 times daily 120 mL 11     FLUoxetine (PROZAC) 10 MG capsule Take 1 capsule (10 mg) by mouth daily 60 capsule 1     fluticasone-salmeterol (AIRDUO RESPICLICK) 113-14 MCG/ACT inhaler Inhale 1 puff into the lungs 2 times daily 3 Inhaler 2     Allergies   Allergen Reactions     Peanuts [Nuts] GI Disturbance     No lab results found.   BP Readings from Last 3 Encounters:   02/12/19 148/81   12/18/18 156/69   11/13/18 144/70    Wt Readings from Last 3 Encounters:   02/12/19 98.9 kg (218 lb)   12/18/18 99.5 kg (219 lb 7 oz)   10/08/18 98 kg (216 lb)                  Labs reviewed in EPIC    Reviewed and updated as needed this visit by clinical staff  Tobacco  Allergies  Meds       Reviewed and updated as needed this visit by Provider         ROS:  Constitutional, HEENT, cardiovascular, pulmonary, GI, , musculoskeletal, neuro, skin, endocrine and psych systems are negative, except as otherwise noted.    OBJECTIVE:     /81 (BP Location: Left arm, Cuff Size: Adult Large)   Pulse 56   Temp 98.5  F (36.9  C) (Oral)   Resp 16   Ht 1.778 m (5' 10\")   Wt 98.9 kg (218 lb)   SpO2 98%   BMI 31.28 kg/m    Body mass index is 31.28 kg/m .  GENERAL: healthy, alert and no distress  EYES: Eyes grossly normal to inspection, PERRL and conjunctivae and sclerae normal  NECK: no adenopathy, no asymmetry, masses, or scars and thyroid normal to palpation  RESP: lungs clear to auscultation - no rales, rhonchi or wheezes  CV: regular rate and rhythm, normal S1 S2, no S3 or S4, no murmur, click or rub, no peripheral edema and peripheral pulses strong  MS: no gross musculoskeletal defects noted, no edema  NEURO: Normal strength and tone, mentation intact and speech normal  PSYCH: mentation appears normal, affect normal/bright    Diagnostic Test Results:  none "     ASSESSMENT/PLAN:         1. Mild major depression (H)  We discussed staying on the 20 mg daily dose for now as he feels better than when he was not on the Prozac , no side effects , I recommend that he comes for a f/u in 3 to 4 months , given four months worth of refills , if the symptoms get better and stable on this , will see him q 6 months . He continues with weekly visits with his therapist .  - FLUoxetine (PROZAC) 20 MG capsule; Take 1 capsule (20 mg) by mouth daily  Dispense: 30 capsule; Refill: 3  - FLUoxetine (PROZAC) 20 MG capsule; Take 1 capsule (20 mg) by mouth daily  Dispense: 90 capsule; Refill: 0    RTC if no improving or worsening.  Pt is aware  and comfortable with the current plan.      Ingrid Kincaid MD  [facility]

## 2019-07-04 DIAGNOSIS — F32.0 MILD MAJOR DEPRESSION (H): ICD-10-CM

## 2019-07-04 DIAGNOSIS — J45.40 MODERATE PERSISTENT ASTHMA WITHOUT COMPLICATION: ICD-10-CM

## 2019-07-05 RX ORDER — ALBUTEROL SULFATE 90 UG/1
AEROSOL, METERED RESPIRATORY (INHALATION)
Qty: 8.5 G | Refills: 0 | Status: SHIPPED | OUTPATIENT
Start: 2019-07-05 | End: 2020-03-12

## 2019-07-05 NOTE — TELEPHONE ENCOUNTER
Prescription approved per [facility] Refill Protocol.  Pt has future appt with [facility] provider  Magui MOCTEZUMA RN

## 2019-07-05 NOTE — TELEPHONE ENCOUNTER
"Last Written Prescription Date:  12/31/2018  Last Fill Quantity: 1,  # refills: 1   Last office visit: 2/12/2019 with prescribing provider:  Adelfo   Future Office Visit:   Next 5 appointments (look out 90 days)    Jul 11, 2019  3:30 PM CDT  Ang Townsend with Devin Sheridan MD  [facility] [address] [address] [phone]           Requested Prescriptions   Pending Prescriptions Disp Refills     PROAIR  (90 Base) MCG/ACT inhaler [Pharmacy Med Name: ALBUTEROL(PROAIR)HFA IN 8.5G 90MCG] 8.5 g 1     Sig: INHALE 2 INHALATIONS EVERY 6 HOURS AS NEEDED FOR SHORTNESS OF BREATH/DYSPNEA OR WHEEZING       Asthma Maintenance Inhalers - Anticholinergics Failed - 7/4/2019  5:39 PM        Failed - Asthma control assessment score within normal limits in last 6 months     Please review ACT score.           Passed - Patient is age 12 years or older        Passed - Medication is active on med list        Passed - Recent (6 mo) or future (30 days) visit within the authorizing provider's specialty     Patient had office visit in the last 6 months or has a visit in the next 30 days with authorizing provider or within the authorizing provider's specialty.  See \"Patient Info\" tab in inbasket, or \"Choose Columns\" in Meds & Orders section of the refill encounter.              "

## 2019-07-05 NOTE — TELEPHONE ENCOUNTER
"Last Written Prescription Date:  2/12/2019  Last Fill Quantity: 90,  # refills: 0   Last office visit: 2/12/2019 with prescribing provider:  Codi   Future Office Visit:   Next 5 appointments (look out 90 days)    Jul 11, 2019  3:30 PM CDT  Ang Townsend with Devin Sheridan MD  [facility] [address] [address] [phone]           Requested Prescriptions   Pending Prescriptions Disp Refills     FLUoxetine (PROZAC) 20 MG capsule [Pharmacy Med Name: FLUOXETINE HCL CAPS 20MG] 90 capsule 0     Sig: TAKE 1 CAPSULE DAILY       SSRIs Protocol Failed - 7/4/2019  5:39 PM        Failed - PHQ-9 score less than 5 in past 6 months     Please review last PHQ-9 score.           Passed - Medication is active on med list        Passed - Patient is age 18 or older        Passed - Recent (6 mo) or future (30 days) visit within the authorizing provider's specialty     Patient had office visit in the last 6 months or has a visit in the next 30 days with authorizing provider or within the authorizing provider's specialty.  See \"Patient Info\" tab in inbasket, or \"Choose Columns\" in Meds & Orders section of the refill encounter.              "

## 2019-07-05 NOTE — TELEPHONE ENCOUNTER
Prescription approved per [facility] Refill Protocol.  Pt has future appt with [place] provider  Magui MOCTEZUMA RN

## 2020-03-11 ENCOUNTER — HEALTH MAINTENANCE LETTER (OUTPATIENT)
Age: 30
End: 2020-03-11

## 2020-03-12 ENCOUNTER — MYC REFILL (OUTPATIENT)
Dept: FAMILY MEDICINE | Facility: CLINIC | Age: 30
End: 2020-03-12

## 2020-03-12 DIAGNOSIS — J45.40 MODERATE PERSISTENT ASTHMA WITHOUT COMPLICATION: ICD-10-CM

## 2020-03-17 NOTE — TELEPHONE ENCOUNTER
Routing refill request to provider for review/approval because:  Pt has future appt to see Dr Sheridan in [place] instead  Magui MOCTEZUMA RN

## 2020-03-18 RX ORDER — FLUTICASONE PROPIONATE AND SALMETEROL 113; 14 UG/1; UG/1
1 POWDER, METERED RESPIRATORY (INHALATION) 2 TIMES DAILY
Qty: 1 INHALER | Refills: 0 | Status: SHIPPED | OUTPATIENT
Start: 2020-03-18 | End: 2020-11-20

## 2020-03-18 RX ORDER — ALBUTEROL SULFATE 90 UG/1
2 AEROSOL, METERED RESPIRATORY (INHALATION) EVERY 6 HOURS PRN
Qty: 8.5 G | Refills: 0 | Status: SHIPPED | OUTPATIENT
Start: 2020-03-18 | End: 2020-11-20

## 2020-06-16 ENCOUNTER — MYC REFILL (OUTPATIENT)
Dept: DERMATOLOGY | Facility: CLINIC | Age: 30
End: 2020-06-16

## 2020-06-16 DIAGNOSIS — L73.9 FOLLICULITIS: ICD-10-CM

## 2020-06-16 RX ORDER — CLINDAMYCIN PHOSPHATE 10 UG/ML
LOTION TOPICAL 2 TIMES DAILY
Qty: 120 ML | Refills: 11 | Status: CANCELLED | OUTPATIENT
Start: 2020-06-16

## 2020-10-05 ENCOUNTER — VIRTUAL VISIT (OUTPATIENT)
Dept: FAMILY MEDICINE | Facility: OTHER | Age: 30
End: 2020-10-05

## 2020-10-06 NOTE — PROGRESS NOTES
"Date: 10/05/2020 20:55:32  Clinician: Jonny Mahan  Clinician NPI: [id number]  Patient: Johnson Ayon  Patient : 1990  Patient Address: [address] [place] [address]  Patient Phone: [phone]  Visit Protocol: URI  Patient Summary:  Johnson is a 30 year old ( : 1990 ) male who initiated a [facility] Visit for COVID-19 (Coronavirus) evaluation and screening. When asked the question \"Please sign me up to receive news, health information and promotions. \", Johnson responded \"No\".    Johnson states his symptoms started 1-2 days ago.   His symptoms consist of nasal congestion, rhinitis, and malaise.   Symptom details   Nasal secretions: The color of his mucus is clear.   Johnson denies having ear pain, headache, wheezing, fever, cough, anosmia, vomiting, nausea, facial pain or pressure, myalgias, chills, sore throat, teeth pain, ageusia, and diarrhea. He also denies taking antibiotic medication in the past month and having recent facial or sinus surgery in the past 60 days. He is not experiencing dyspnea.    Pertinent COVID-19 (Coronavirus) information  In the past 14 days, Johnson has not worked in a congregate living setting.   He does not work or volunteer as healthcare worker or a  and does not work or volunteer in a healthcare facility.   Johnson also has not lived in a congregate living setting in the past 14 days. He does not live with a healthcare worker.   Johnson has not had a close contact with a laboratory-confirmed COVID-19 patient within 14 days of symptom onset.   Since 2019, Johnson and has had upper respiratory infection (URI) or influenza-like illness. Has not been diagnosed with lab-confirmed COVID-19 test      Date(s) of previous URI or influenza-like illness (free-text): Early February     Symptoms Johnson experienced during previous URI or influenza-like illness as reported by the patient (free-text): Aches, fever, stuffy nose, cough        Pertinent medical " history  Johnson does not need a return to work/school note.   Weight: 222 lbs   Johnson does not smoke or use smokeless tobacco.   Weight: 222 lbs    MEDICATIONS: albuterol sulfate inhalation, ALLERGIES: NKDA  Clinician Response:  Dear Johnson,   Your symptoms show that you may have coronavirus (COVID-19). This illness can cause fever, cough and trouble breathing. Many people get a mild case and get better on their own. Some people can get very sick.  What should I do?  We would like to test you for this virus.   1. Please call [phone] to schedule your visit. Explain that you were referred by [facility] to have a COVID-19 test. Be [facility].  The following will serve as your written order for this COVID Test, ordered by me, for the indication of suspected COVID [Z20.828]: The test will be ordered in [facility], our electronic health record, after you are scheduled. It will show as ordered and authorized by Jax Carolina MD.  Order: COVID-19 (Coronavirus) PCR for SYMPTOMATIC testing from [facility].      2. When it's time for your COVID test:  Stay at least 6 feet away from others. (If someone will drive you to your test, stay in the backseat, as far away from the  as you can.)   Cover your mouth and nose with a mask, tissue or washcloth.  Go straight to the testing site. Don't make any stops on the way there or back.      3.Starting now: Stay home and away from others (self-isolate) until:   You've had no fever---and no medicine that reduces fever---for one full day (24 hours). And...   Your other symptoms have gotten better. For example, your cough or breathing has improved. And...   At least 10 days have passed since your symptoms started.       During this time, don't leave the house except for testing or medical care.   Stay in your own room, even for meals. Use your own bathroom if you can.   Stay away from others in your home. No hugging, kissing or shaking hands. No visitors.  Don't  "go to work, school or anywhere else.    Clean \"high touch\" surfaces often (doorknobs, counters, handles, etc.). Use a household cleaning spray or wipes. You'll find a full list of  on the EPA website: www.epa.gov/pesticide-registration/list-n-disinfectants-use-against-sars-cov-2.   Cover your mouth and nose with a mask, tissue or washcloth to avoid spreading germs.  Wash your hands and face often. Use soap and water.  Caregivers in these groups are at risk for severe illness due to COVID-19:  o People 65 years and older  o People who live in a nursing home or long-term care facility  o People with chronic disease (lung, heart, cancer, diabetes, kidney, liver, immunologic)  o People who have a weakened immune system, including those who:   Are in cancer treatment  Take medicine that weakens the immune system, such as corticosteroids  Had a bone marrow or organ transplant  Have an immune deficiency  Have poorly controlled HIV or AIDS  Are obese (body mass index of 40 or higher)  Smoke regularly   o Caregivers should wear gloves while washing dishes, handling laundry and cleaning bedrooms and bathrooms.  o Use caution when washing and drying laundry: Don't shake dirty laundry, and use the warmest water setting that you can.  o For more tips, go to www.cdc.gov/coronavirus/2019-ncov/downloads/10Things.pdf.    4.Sign up for [employer]. We know it's scary to hear that you might have COVID-19. We want to track your symptoms to make sure you're okay over the next 2 weeks. Please look for an email from [employer]---this is a free, online program that we'll use to keep in touch. To sign up, follow the link in the email. Learn more at [email or web address]  How can I take care of myself?   Get lots of rest. Drink extra fluids (unless a doctor has told you not to).   Take Tylenol (acetaminophen) for fever or pain. If you have liver or kidney problems, ask your family doctor if it's okay to take Tylenol.   " Adults can take either:    650 mg (two 325 mg pills) every 4 to 6 hours, or...   1,000 mg (two 500 mg pills) every 8 hours as needed.    Note: Don't take more than 3,000 mg in one day. Acetaminophen is found in many medicines (both prescribed and over-the-counter medicines). Read all labels to be sure you don't take too much.   For children, check the Tylenol bottle for the right dose. The dose is based on the child's age or weight.    If you have other health problems (like cancer, heart failure, an organ transplant or severe kidney disease): Call your specialty clinic if you don't feel better in the next 2 days.       Know when to call 911. Emergency warning signs include:    Trouble breathing or shortness of breath Pain or pressure in the chest that doesn't go away Feeling confused like you haven't felt before, or not being able to wake up Bluish-colored lips or face.  Where can I get more information?   [facility] -- About COVID-19: [email or web address]   CDC -- What to Do If You're Sick: www.cdc.gov/coronavirus/2019-ncov/about/steps-when-sick.html   CDC -- Ending Home Isolation: www.cdc.gov/coronavirus/2019-ncov/hcp/disposition-in-home-patients.html   [facility] -- Caring for Someone: www.cdc.gov/coronavirus/2019-ncov/if-you-are-sick/care-for-someone.html   [facility] -- Interim Guidance for Hospital Discharge to Home: www.health.[facility].[place].us/diseases/coronavirus/hcp/hospdischarge.pdf   [facility] clinical trials (COVID-19 research studies): clinicalaffairs.[facility]-clinical-trials    Below are the COVID-19 hotlines at the [place] Department of Health ([facility]). Interpreters are available.    For health questions: Call 250-043-7072 or 1-320.289.8460 (7 a.m. to 7 p.m.) For questions about schools and childcare: Call 146-748-8502 or 1-389.995.2069 (7 a.m. to 7 p.m.)    Diagnosis: Other malaise  Diagnosis ICD: R53.81

## 2020-11-17 ENCOUNTER — MYC REFILL (OUTPATIENT)
Dept: FAMILY MEDICINE | Facility: CLINIC | Age: 30
End: 2020-11-17

## 2020-11-17 DIAGNOSIS — J45.40 MODERATE PERSISTENT ASTHMA WITHOUT COMPLICATION: ICD-10-CM

## 2020-11-17 RX ORDER — FLUTICASONE PROPIONATE AND SALMETEROL 113; 14 UG/1; UG/1
1 POWDER, METERED RESPIRATORY (INHALATION) 2 TIMES DAILY
Qty: 1 INHALER | Refills: 0 | Status: CANCELLED | OUTPATIENT
Start: 2020-11-17

## 2020-11-20 ENCOUNTER — VIRTUAL VISIT (OUTPATIENT)
Dept: FAMILY MEDICINE | Facility: CLINIC | Age: 30
End: 2020-11-20
Payer: COMMERCIAL

## 2020-11-20 ENCOUNTER — MYC MEDICAL ADVICE (OUTPATIENT)
Dept: FAMILY MEDICINE | Facility: CLINIC | Age: 30
End: 2020-11-20

## 2020-11-20 DIAGNOSIS — J45.40 MODERATE PERSISTENT ASTHMA WITHOUT COMPLICATION: ICD-10-CM

## 2020-11-20 PROCEDURE — 99213 OFFICE O/P EST LOW 20 MIN: CPT | Mod: 95 | Performed by: FAMILY MEDICINE

## 2020-11-20 RX ORDER — FLUTICASONE PROPIONATE AND SALMETEROL 113; 14 UG/1; UG/1
1 POWDER, METERED RESPIRATORY (INHALATION) 2 TIMES DAILY
Qty: 1 INHALER | Refills: 3 | Status: SHIPPED | OUTPATIENT
Start: 2020-11-20 | End: 2021-05-18

## 2020-11-20 RX ORDER — ALBUTEROL SULFATE 90 UG/1
2 AEROSOL, METERED RESPIRATORY (INHALATION) EVERY 6 HOURS PRN
Qty: 1 INHALER | Refills: 3 | Status: SHIPPED | OUTPATIENT
Start: 2020-11-20 | End: 2021-05-18

## 2020-11-20 NOTE — PROGRESS NOTES
"Johnson Ayon is a 30 year old male who is being evaluated via a billable video visit.      The patient has been notified of following:     \"This video visit will be conducted via a call between you and your physician/provider. We have found that certain health care needs can be provided without the need for an in-person physical exam.  This service lets us provide the care you need with a video conversation.  If a prescription is necessary we can send it directly to your pharmacy.  If lab work is needed we can place an order for that and you can then stop by our lab to have the test done at a later time.    Video visits are billed at different rates depending on your insurance coverage.  Please reach out to your insurance provider with any questions.    If during the course of the call the physician/provider feels a video visit is not appropriate, you will not be charged for this service.\"    Patient has given verbal consent for Video visit? Yes  How would you like to obtain your AVS? MyChart  If you are dropped from the video visit, the video invite should be resent to: Other e-mail: [phone] please use doximity   Will anyone else be joining your video visit? No      Subjective     Johnson Ayon is a 30 year old male who presents today via video visit for the following health issues:    HPI     Medication Followup of albuterol/fluticasone    Taking Medication as prescribed: yes    Side Effects:  None    Medication Helping Symptoms:  yes        Pt had a last visit here in clinic February 12, 2019 . He has had an [facility] visit on October 5th, 2020 to check if he had Covid 19.   States that he is doing well and only needs his Airduo inhaler refilled and is upset that he has to make a virtual visit with me. We discussed that this is [facility] policy and our clinic policy to see patients who are stable at least once a year and check on their status and in his case it has been one year and 10 months " since his last check .  Asthma is under control when he uses the maintenance inhaler , then he uses less of the Albuterol  Denies any side effects with the inhalers  Video Start Time: 4:30 pm        Review of Systems   Constitutional, HEENT, cardiovascular, pulmonary, GI, , musculoskeletal, neuro, skin, endocrine and psych systems are negative, except as otherwise noted.      Objective           Vitals:  No vitals were obtained today due to virtual visit.    Physical Exam     GENERAL: Healthy, alert and no distress  EYES: Eyes grossly normal to inspection.  No discharge or erythema, or obvious scleral/conjunctival abnormalities.  RESP: No audible wheeze, cough, or visible cyanosis.  No visible retractions or increased work of breathing.    SKIN: Visible skin clear. No significant rash, abnormal pigmentation or lesions.  NEURO: Cranial nerves grossly intact.  Mentation and speech appropriate for age.  PSYCH: Mentation appears normal, affect normal/bright, judgement and insight intact, normal speech and appearance well-groomed.      No results found for any visits on 11/20/20.        Assessment & Plan     Moderate persistent asthma without complication  Refilled his inhalers and discussed needs at least a virtual visit once a year in order for me to continue to refill his medications, it has been close to two years since his last visit in our clinic . He wants to be able to refill his medications without any visits and then he expressed a desire to find a provider who will be refilling his medications without having to have an appointment  , he is currently stable with his asthma .  - albuterol (PROAIR HFA) 108 (90 Base) MCG/ACT inhaler; Inhale 2 puffs into the lungs every 6 hours as needed for shortness of breath / dyspnea or wheezing  - fluticasone-salmeterol (AIRDUO RESPICLICK) 113-14 MCG/ACT inhaler; Inhale 1 puff into the lungs 2 times daily        RTC if no improving or worsening.  Pt is aware  and comfortable  with the current plan.      Ingrid Kincaid MD  [facility]      Video-Visit Details    Type of service:  Video Visit    Video End Time:4:35 pm     Originating Location (pt. Location): Home    Distant Location (provider location):  [facility]     Platform used for Video Visit: [employer]

## 2021-04-25 ENCOUNTER — HEALTH MAINTENANCE LETTER (OUTPATIENT)
Age: 31
End: 2021-04-25

## 2021-05-14 ENCOUNTER — OFFICE VISIT (OUTPATIENT)
Dept: FAMILY MEDICINE | Facility: CLINIC | Age: 31
End: 2021-05-14
Payer: COMMERCIAL

## 2021-05-14 VITALS
SYSTOLIC BLOOD PRESSURE: 146 MMHG | DIASTOLIC BLOOD PRESSURE: 89 MMHG | WEIGHT: 225 LBS | BODY MASS INDEX: 32.21 KG/M2 | TEMPERATURE: 97.5 F | OXYGEN SATURATION: 100 % | HEIGHT: 70 IN | HEART RATE: 83 BPM

## 2021-05-14 DIAGNOSIS — J45.40 MODERATE PERSISTENT ASTHMA, UNSPECIFIED WHETHER COMPLICATED: Primary | ICD-10-CM

## 2021-05-14 DIAGNOSIS — Z88.9 HISTORY OF MULTIPLE ALLERGIES: ICD-10-CM

## 2021-05-14 DIAGNOSIS — I10 ESSENTIAL HYPERTENSION: ICD-10-CM

## 2021-05-14 DIAGNOSIS — Z13.220 LIPID SCREENING: ICD-10-CM

## 2021-05-14 LAB
ALBUMIN SERPL-MCNC: 3.9 G/DL (ref 3.4–5)
ALP SERPL-CCNC: 110 U/L (ref 40–150)
ALT SERPL W P-5'-P-CCNC: 64 U/L (ref 0–70)
ANION GAP SERPL CALCULATED.3IONS-SCNC: 1 MMOL/L (ref 3–14)
AST SERPL W P-5'-P-CCNC: 37 U/L (ref 0–45)
BILIRUB SERPL-MCNC: 0.3 MG/DL (ref 0.2–1.3)
BUN SERPL-MCNC: 17 MG/DL (ref 7–30)
CALCIUM SERPL-MCNC: 9.5 MG/DL (ref 8.5–10.1)
CHLORIDE SERPL-SCNC: 107 MMOL/L (ref 94–109)
CHOLEST SERPL-MCNC: 192 MG/DL
CO2 SERPL-SCNC: 30 MMOL/L (ref 20–32)
CREAT SERPL-MCNC: 1.03 MG/DL (ref 0.66–1.25)
ERYTHROCYTE [DISTWIDTH] IN BLOOD BY AUTOMATED COUNT: 13.4 % (ref 10–15)
GFR SERPL CREATININE-BSD FRML MDRD: >90 ML/MIN/{1.73_M2}
GLUCOSE SERPL-MCNC: 71 MG/DL (ref 70–99)
HCT VFR BLD AUTO: 43.2 % (ref 40–53)
HDLC SERPL-MCNC: 39 MG/DL
HGB BLD-MCNC: 14.5 G/DL (ref 13.3–17.7)
LDLC SERPL CALC-MCNC: 125 MG/DL
MCH RBC QN AUTO: 29 PG (ref 26.5–33)
MCHC RBC AUTO-ENTMCNC: 33.6 G/DL (ref 31.5–36.5)
MCV RBC AUTO: 86 FL (ref 78–100)
NONHDLC SERPL-MCNC: 153 MG/DL
PLATELET # BLD AUTO: 269 10E9/L (ref 150–450)
POTASSIUM SERPL-SCNC: 4.7 MMOL/L (ref 3.4–5.3)
PROT SERPL-MCNC: 7.9 G/DL (ref 6.8–8.8)
RBC # BLD AUTO: 5 10E12/L (ref 4.4–5.9)
SODIUM SERPL-SCNC: 138 MMOL/L (ref 133–144)
TRIGL SERPL-MCNC: 142 MG/DL
WBC # BLD AUTO: 8.2 10E9/L (ref 4–11)

## 2021-05-14 PROCEDURE — 99214 OFFICE O/P EST MOD 30 MIN: CPT | Performed by: INTERNAL MEDICINE

## 2021-05-14 PROCEDURE — 80053 COMPREHEN METABOLIC PANEL: CPT | Performed by: INTERNAL MEDICINE

## 2021-05-14 PROCEDURE — 36415 COLL VENOUS BLD VENIPUNCTURE: CPT | Performed by: INTERNAL MEDICINE

## 2021-05-14 PROCEDURE — 85027 COMPLETE CBC AUTOMATED: CPT | Performed by: INTERNAL MEDICINE

## 2021-05-14 PROCEDURE — 80061 LIPID PANEL: CPT | Performed by: INTERNAL MEDICINE

## 2021-05-14 PROCEDURE — 82043 UR ALBUMIN QUANTITATIVE: CPT | Performed by: INTERNAL MEDICINE

## 2021-05-14 RX ORDER — PREDNISOLONE ACETATE 10 MG/ML
SUSPENSION/ DROPS OPHTHALMIC
COMMUNITY
Start: 2021-04-18 | End: 2021-09-01

## 2021-05-14 SDOH — HEALTH STABILITY: MENTAL HEALTH: HOW MANY STANDARD DRINKS CONTAINING ALCOHOL DO YOU HAVE ON A TYPICAL DAY?: 3 OR 4

## 2021-05-14 SDOH — HEALTH STABILITY: MENTAL HEALTH: HOW OFTEN DO YOU HAVE A DRINK CONTAINING ALCOHOL?: 4 OR MORE TIMES A WEEK

## 2021-05-14 SDOH — HEALTH STABILITY: MENTAL HEALTH: HOW OFTEN DO YOU HAVE 6 OR MORE DRINKS ON ONE OCCASION?: NOT ASKED

## 2021-05-14 ASSESSMENT — MIFFLIN-ST. JEOR: SCORE: 1981.84

## 2021-05-14 NOTE — PROGRESS NOTES
"    Assessment & Plan   Problem List Items Addressed This Visit     None      Visit Diagnoses     Moderate persistent asthma, unspecified whether complicated    -  Primary    Relevant Orders    CBC with platelets (Completed)    Comprehensive metabolic panel (BMP + Alb, Alk Phos, ALT, AST, Total. Bili, TP) (Completed)    ALLERGY/ASTHMA ADULT REFERRAL    Lipid screening        Relevant Orders    Lipid panel reflex to direct LDL Fasting (Completed)    Essential hypertension        Relevant Orders    CBC with platelets (Completed)    Comprehensive metabolic panel (BMP + Alb, Alk Phos, ALT, AST, Total. Bili, TP) (Completed)    Albumin Random Urine Quantitative with Creat Ratio (Completed)    History of multiple allergies        Relevant Orders    ALLERGY/ASTHMA ADULT REFERRAL           Advised to call us with blood pressure readings  We will check chemistry panel will start him on antihypertensive medications.  Has several readings of blood pressure and were elevated.  Patient in agreement with seeing an allergy specialist.  Is using his airduo inhaler twice a day daily.  Patient has stopped taking fluoxetine.           BMI:   Estimated body mass index is 32.28 kg/m  as calculated from the following:    Height as of this encounter: 1.778 m (5' 10\").    Weight as of this encounter: 102.1 kg (225 lb).   Weight management plan: Discussed healthy diet and exercise guidelines    CONSULTATION/REFERRAL to asthma/ allergy  Work on weight loss  Regular exercise  See Patient Instructions    Return in about 3 months (around 8/14/2021), or if symptoms worsen or fail to improve, for As needed and if symptoms worsen, other.    Devin Sheridan MD  [facility] FRANCISCA Costa is a 30 year old who presents for the following health issues     HPI     New Patient/Transfer of Care  Uses Airduo twice a day , used in [place], was taking Singulair, WANTS TO RESTART ON SINGULAIR,     Would consider seeing an allergy " "specialist    Developed \"bad egg allergies\",     At same time seasonal allergies,       BP still high at home    Has put on 20 lbs since had 2 kids, was more active with \"power \",     Donates blood and was told his BP was high      Review of Systems   Constitutional, HEENT, cardiovascular, pulmonary, GI, , musculoskeletal, neuro, skin, endocrine and psych systems are negative, except as otherwise noted.      Objective    BP (!) 146/89   Pulse 83   Temp 97.5  F (36.4  C) (Temporal)   Ht 1.778 m (5' 10\")   Wt 102.1 kg (225 lb)   SpO2 100%   BMI 32.28 kg/m    Body mass index is 32.28 kg/m .  Physical Exam   GENERAL: healthy, alert and no distress  EYES: Eyes grossly normal to inspection, PERRL and conjunctivae and sclerae normal  RESP: lungs clear to auscultation - no rales, rhonchi or wheezes  CV: regular rate and rhythm, normal S1 S2, no S3 or S4, no murmur, click or rub, no peripheral edema and peripheral pulses strong  ABDOMEN: soft, nontender  MS: no gross musculoskeletal defects noted, no edema  SKIN: no suspicious lesions or rashes  NEURO: Normal strength and tone, mentation intact and speech normal  PSYCH: mentation appears normal, affect normal/bright    No results found for any previous visit.               "

## 2021-05-15 DIAGNOSIS — I10 ESSENTIAL HYPERTENSION: Primary | ICD-10-CM

## 2021-05-15 DIAGNOSIS — E78.5 DYSLIPIDEMIA: ICD-10-CM

## 2021-05-15 LAB
CREAT UR-MCNC: 185 MG/DL
MICROALBUMIN UR-MCNC: 18 MG/L
MICROALBUMIN/CREAT UR: 9.89 MG/G CR (ref 0–17)

## 2021-05-15 RX ORDER — LOSARTAN POTASSIUM AND HYDROCHLOROTHIAZIDE 12.5; 5 MG/1; MG/1
TABLET ORAL
Qty: 45 TABLET | Refills: 0 | Status: SHIPPED | OUTPATIENT
Start: 2021-05-15 | End: 2021-06-02

## 2021-05-17 ENCOUNTER — MYC MEDICAL ADVICE (OUTPATIENT)
Dept: FAMILY MEDICINE | Facility: CLINIC | Age: 31
End: 2021-05-17

## 2021-05-17 DIAGNOSIS — J45.40 MODERATE PERSISTENT ASTHMA WITHOUT COMPLICATION: ICD-10-CM

## 2021-05-18 RX ORDER — FLUTICASONE PROPIONATE AND SALMETEROL 113; 14 UG/1; UG/1
1 POWDER, METERED RESPIRATORY (INHALATION) 2 TIMES DAILY
Qty: 1 EACH | Refills: 1 | Status: SHIPPED | OUTPATIENT
Start: 2021-05-18 | End: 2021-07-23 | Stop reason: ALTCHOICE

## 2021-05-18 RX ORDER — ALBUTEROL SULFATE 90 UG/1
2 AEROSOL, METERED RESPIRATORY (INHALATION) EVERY 6 HOURS PRN
Qty: 18 G | Refills: 1 | Status: SHIPPED | OUTPATIENT
Start: 2021-05-18 | End: 2021-11-11

## 2021-05-18 NOTE — TELEPHONE ENCOUNTER
Please see patient's mychart:    Patient requesting to restart singulair/montelukast. Spoke about it in last office visit on 5/14/21.   Patient also requesting refills of inhaler medications (will route to med refills basket)    Please reply back to patient or advise if triage follow up needed.    Hema Schmidt RN  [facility]

## 2021-05-20 ENCOUNTER — TELEPHONE (OUTPATIENT)
Dept: FAMILY MEDICINE | Facility: CLINIC | Age: 31
End: 2021-05-20

## 2021-05-20 NOTE — TELEPHONE ENCOUNTER
Sent [employer] message with the following information in 5/17/21     Hema Schmidt RN  [facility]

## 2021-05-20 NOTE — TELEPHONE ENCOUNTER
Disp Refills Start End ADORE   fluticasone-salmeterol (AIRDUO RESPICLICK) 113-14 MCG/ACT inhaler 1 each 1 5/18/2021  No   Sig - Route: Inhale 1 puff into the lungs 2 times daily - Inhalation     Per pharmacy above med is not covered by patient's insurance, pharmacy requesting alternative    Pharmacy lists Advair Diskus, Advair HFA, Breo Ellipta, and Symbicort as covered alternatives

## 2021-05-20 NOTE — TELEPHONE ENCOUNTER
Please notify patient of insurance not covering AIRDUO medication, does he agree with switching to Advair? Also has patient scheduled with asthma/allergy specialist.?    Dr Sheridan

## 2021-05-23 ASSESSMENT — PATIENT HEALTH QUESTIONNAIRE - PHQ9: SUM OF ALL RESPONSES TO PHQ QUESTIONS 1-9: 2

## 2021-05-24 ASSESSMENT — ASTHMA QUESTIONNAIRES: ACT_TOTALSCORE: 18

## 2021-05-24 NOTE — TELEPHONE ENCOUNTER
See below, pt agreeable to trying Advair instead (unsure what dosage to pend)     Tabatha SCHUSTER RN

## 2021-06-07 DIAGNOSIS — I10 ESSENTIAL HYPERTENSION: ICD-10-CM

## 2021-06-08 RX ORDER — LOSARTAN POTASSIUM AND HYDROCHLOROTHIAZIDE 12.5; 5 MG/1; MG/1
TABLET ORAL
OUTPATIENT
Start: 2021-06-08

## 2021-06-08 NOTE — TELEPHONE ENCOUNTER
This needs Dispense quantity and refills.    BP Readings from Last 3 Encounters:   05/14/21 (!) 146/89   02/12/19 148/81   12/18/18 156/69     Creatinine   Date Value Ref Range Status   05/14/2021 1.03 0.66 - 1.25 mg/dL Final     Last time listed in the chart it is Historical.    Please refill as appropriate.    Agueda Patel RN  [facility]

## 2021-06-09 RX ORDER — LOSARTAN POTASSIUM AND HYDROCHLOROTHIAZIDE 12.5; 5 MG/1; MG/1
TABLET ORAL
Qty: 90 TABLET | Refills: 0 | Status: SHIPPED | OUTPATIENT
Start: 2021-06-09 | End: 2021-09-03

## 2021-07-15 DIAGNOSIS — J45.40 MODERATE PERSISTENT ASTHMA WITHOUT COMPLICATION: ICD-10-CM

## 2021-07-15 NOTE — TELEPHONE ENCOUNTER
Advair 250-50 diskus    Summary: Inhale 1 puff into the lungs every 12 hours, Disp-1 each, R-1, E-Prescribe   Dose, Route, Frequency: 1 puff, Inhalation, EVERY 12 HOURS  Start: 6/18/2021  Ord/Sold: 6/18/2021

## 2021-07-16 NOTE — TELEPHONE ENCOUNTER
ACT Total Scores 12/18/2018 5/23/2021   ACT TOTAL SCORE (Goal Greater than or Equal to 20) 25 18   In the past 12 months, how many times did you visit the emergency room for your asthma without being admitted to the hospital? 0 0   In the past 12 months, how many times were you hospitalized overnight because of your asthma? 0 0     Routing refill request to provider for review/approval because:  Last ACT score <20

## 2021-07-23 ENCOUNTER — OFFICE VISIT (OUTPATIENT)
Dept: FAMILY MEDICINE | Facility: CLINIC | Age: 31
End: 2021-07-23
Payer: COMMERCIAL

## 2021-07-23 VITALS
HEIGHT: 70 IN | OXYGEN SATURATION: 99 % | WEIGHT: 217 LBS | HEART RATE: 65 BPM | SYSTOLIC BLOOD PRESSURE: 136 MMHG | BODY MASS INDEX: 31.07 KG/M2 | DIASTOLIC BLOOD PRESSURE: 76 MMHG

## 2021-07-23 DIAGNOSIS — E78.5 DYSLIPIDEMIA: ICD-10-CM

## 2021-07-23 DIAGNOSIS — I10 ESSENTIAL HYPERTENSION: ICD-10-CM

## 2021-07-23 DIAGNOSIS — I10 HYPERTENSION, UNSPECIFIED TYPE: Primary | ICD-10-CM

## 2021-07-23 LAB
ANION GAP SERPL CALCULATED.3IONS-SCNC: 3 MMOL/L (ref 3–14)
BUN SERPL-MCNC: 15 MG/DL (ref 7–30)
CALCIUM SERPL-MCNC: 10.1 MG/DL (ref 8.5–10.1)
CHLORIDE BLD-SCNC: 105 MMOL/L (ref 94–109)
CHOLEST SERPL-MCNC: 220 MG/DL
CO2 SERPL-SCNC: 27 MMOL/L (ref 20–32)
CREAT SERPL-MCNC: 1.09 MG/DL (ref 0.66–1.25)
FASTING STATUS PATIENT QL REPORTED: NO
GFR SERPL CREATININE-BSD FRML MDRD: 90 ML/MIN/1.73M2
GLUCOSE BLD-MCNC: 89 MG/DL (ref 70–99)
HDLC SERPL-MCNC: 47 MG/DL
LDLC SERPL CALC-MCNC: 153 MG/DL
NONHDLC SERPL-MCNC: 173 MG/DL
POTASSIUM BLD-SCNC: 4.5 MMOL/L (ref 3.4–5.3)
SODIUM SERPL-SCNC: 135 MMOL/L (ref 133–144)
TRIGL SERPL-MCNC: 100 MG/DL

## 2021-07-23 PROCEDURE — 80061 LIPID PANEL: CPT | Performed by: INTERNAL MEDICINE

## 2021-07-23 PROCEDURE — 36415 COLL VENOUS BLD VENIPUNCTURE: CPT | Performed by: INTERNAL MEDICINE

## 2021-07-23 PROCEDURE — 80048 BASIC METABOLIC PNL TOTAL CA: CPT | Performed by: INTERNAL MEDICINE

## 2021-07-23 PROCEDURE — 99214 OFFICE O/P EST MOD 30 MIN: CPT | Performed by: INTERNAL MEDICINE

## 2021-07-23 ASSESSMENT — MIFFLIN-ST. JEOR: SCORE: 1945.56

## 2021-07-23 NOTE — PROGRESS NOTES
Assessment & Plan   Problem List Items Addressed This Visit     None      Visit Diagnoses     Hypertension, unspecified type    -  Primary    Relevant Orders    Echocardiogram Complete    US Renal Complete w Doppler Complete    Essential hypertension        Dyslipidemia               Discussed differential diagnosis of hypertension ;secondary hypertension versus primary.  Patient in agreement to do ultrasound of the kidneys and echo, he met his deductible's.  We will repeat basic metabolic today check his potassium.  Continue follow-up with allergy asthma specialist.  Continue to call us with blood pressure readings.  Advised of side effects of diuretics.  He denies any side effects from losartan, no cough,; irritative cough.  No other systemic complaints, follow-up in 3 months and as needed ,discussed importance of weight loss exercise healthy diet.  He denies any history of apnea ,occasionally he snores.  He had adenoidectomy when he was in his 20s.  Advised that his significant other watches him closely for any signs or symptoms of sleep apnea.  He denies being fatigued or somnolent during the day.  All questions answered.           Return in about 3 months (around 10/23/2021), or if symptoms worsen or fail to improve, for As needed and if symptoms worsen, BP Recheck, Physical Exam.    Devin Sheridan MD  [facility] FRANCISCA Costa is a 30 year old who presents for the following health issues     HPI     Patient presenting for evaluation of hypertension, follow-up.  Seen allergy specialist, he will be getting allergy shots.  He reports his blood pressure at home is in the 130s over 80s.  Denies any palpitations, chest pain ,shortness of breath.  His asthma has been under better control.  He is having some flares with allergies in his eyes.  He was shifted to Xyzal instead of Claritin and he is currently covered for Advair by his insurance.  Denies any dizziness  "lightheadedness.      Review of Systems   Constitutional, HEENT, cardiovascular, pulmonary, gi and gu systems are negative, except as otherwise noted.      Objective    /76 (BP Location: Left arm, Patient Position: Chair, Cuff Size: Adult Regular)   Pulse 65   Ht 1.778 m (5' 10\")   Wt 98.4 kg (217 lb)   SpO2 99%   BMI 31.14 kg/m    Body mass index is 31.14 kg/m .  Physical Exam   GENERAL: healthy, alert and no distress  EYES: Eyes grossly normal to inspection, PERRL and conjunctivae and sclerae normal  RESP: lungs clear to auscultation - no rales, rhonchi or wheezes  CV: regular rate and rhythm, normal S1 S2, no S3 or S4, no murmur, click or rub, no peripheral edema and peripheral pulses +1-2, could not detect very well if any brachial femoral pulse delay  ABDOMEN: soft, nontender, no hepatosplenomegaly, no masses and bowel sounds normal  MS: no gross musculoskeletal defects noted, no edema  SKIN: no suspicious lesions or rashes and diffuse papules, diffuse swelling  NEURO: Normal strength and tone, mentation intact and speech normal  PSYCH: mentation appears normal, affect normal/bright    Office Visit on 05/14/2021   Component Date Value Ref Range Status     Cholesterol 05/14/2021 192  <200 mg/dL Final     Triglycerides 05/14/2021 142  <150 mg/dL Final    Non Fasting     HDL Cholesterol 05/14/2021 39* >39 mg/dL Final     LDL Cholesterol Calculated 05/14/2021 125* <100 mg/dL Final    Comment: Above desirable:  100-129 mg/dl  Borderline High:  130-159 mg/dL  High:             160-189 mg/dL  Very high:       >189 mg/dl       Non HDL Cholesterol 05/14/2021 153* <130 mg/dL Final    Comment: Above Desirable:  130-159 mg/dl  Borderline high:  160-189 mg/dl  High:             190-219 mg/dl  Very high:       >219 mg/dl       WBC 05/14/2021 8.2  4.0 - 11.0 10e9/L Final     RBC Count 05/14/2021 5.00  4.4 - 5.9 10e12/L Final     Hemoglobin 05/14/2021 14.5  13.3 - 17.7 g/dL Final     Hematocrit 05/14/2021 43.2  40.0 " - 53.0 % Final     MCV 05/14/2021 86  78 - 100 fl Final     MCH 05/14/2021 29.0  26.5 - 33.0 pg Final     MCHC 05/14/2021 33.6  31.5 - 36.5 g/dL Final     RDW 05/14/2021 13.4  10.0 - 15.0 % Final     Platelet Count 05/14/2021 269  150 - 450 10e9/L Final     Sodium 05/14/2021 138  133 - 144 mmol/L Final     Potassium 05/14/2021 4.7  3.4 - 5.3 mmol/L Final     Chloride 05/14/2021 107  94 - 109 mmol/L Final     Carbon Dioxide 05/14/2021 30  20 - 32 mmol/L Final     Anion Gap 05/14/2021 1* 3 - 14 mmol/L Final     Glucose 05/14/2021 71  70 - 99 mg/dL Final    Non Fasting     Urea Nitrogen 05/14/2021 17  7 - 30 mg/dL Final     Creatinine 05/14/2021 1.03  0.66 - 1.25 mg/dL Final     GFR Estimate 05/14/2021 >90  >60 mL/min/[1.73_m2] Final    Comment: Non  GFR Calc  Starting 12/18/2018, serum creatinine based estimated GFR (eGFR) will be   calculated using the Chronic Kidney Disease Epidemiology Collaboration   (CKD-EPI) equation.       GFR Estimate If Black 05/14/2021 >90  >60 mL/min/[1.73_m2] Final    Comment:  GFR Calc  Starting 12/18/2018, serum creatinine based estimated GFR (eGFR) will be   calculated using the Chronic Kidney Disease Epidemiology Collaboration   (CKD-EPI) equation.       Calcium 05/14/2021 9.5  8.5 - 10.1 mg/dL Final     Bilirubin Total 05/14/2021 0.3  0.2 - 1.3 mg/dL Final     Albumin 05/14/2021 3.9  3.4 - 5.0 g/dL Final     Protein Total 05/14/2021 7.9  6.8 - 8.8 g/dL Final     Alkaline Phosphatase 05/14/2021 110  40 - 150 U/L Final     ALT 05/14/2021 64  0 - 70 U/L Final     AST 05/14/2021 37  0 - 45 U/L Final     Creatinine Urine 05/14/2021 185  mg/dL Final     Albumin Urine mg/L 05/14/2021 18  mg/L Final     Albumin Urine mg/g Cr 05/14/2021 9.89  0 - 17 mg/g Cr Final

## 2021-07-24 ASSESSMENT — ASTHMA QUESTIONNAIRES: ACT_TOTALSCORE: 23

## 2021-08-04 DIAGNOSIS — J45.40 MODERATE PERSISTENT ASTHMA WITHOUT COMPLICATION: ICD-10-CM

## 2021-08-11 ENCOUNTER — OFFICE VISIT (OUTPATIENT)
Dept: OPHTHALMOLOGY | Facility: CLINIC | Age: 31
End: 2021-08-11
Payer: COMMERCIAL

## 2021-08-11 DIAGNOSIS — H10.13 ALLERGIC CONJUNCTIVITIS OF BOTH EYES: Primary | ICD-10-CM

## 2021-08-11 PROCEDURE — 92012 INTRM OPH EXAM EST PATIENT: CPT | Performed by: STUDENT IN AN ORGANIZED HEALTH CARE EDUCATION/TRAINING PROGRAM

## 2021-08-11 RX ORDER — FLUOROMETHOLONE 0.1 %
1 SUSPENSION, DROPS(FINAL DOSAGE FORM)(ML) OPHTHALMIC (EYE) 2 TIMES DAILY
Qty: 5 ML | Refills: 4 | Status: SHIPPED | OUTPATIENT
Start: 2021-08-11 | End: 2022-10-13

## 2021-08-11 ASSESSMENT — SLIT LAMP EXAM - LIDS
COMMENTS: NORMAL
COMMENTS: NORMAL

## 2021-08-11 ASSESSMENT — VISUAL ACUITY
METHOD: SNELLEN - LINEAR
OS_CC: 20/30
OS_PH_CC: 20/20
OD_CC: 20/20

## 2021-08-11 ASSESSMENT — EXTERNAL EXAM - LEFT EYE: OS_EXAM: NORMAL

## 2021-08-11 ASSESSMENT — CUP TO DISC RATIO
OD_RATIO: 0.1
OS_RATIO: 0.1

## 2021-08-11 ASSESSMENT — TONOMETRY
OD_IOP_MMHG: 14
IOP_METHOD: APPLANATION
OS_IOP_MMHG: 12

## 2021-08-11 ASSESSMENT — EXTERNAL EXAM - RIGHT EYE: OD_EXAM: NORMAL

## 2021-08-11 NOTE — PROGRESS NOTES
Current Eye Medications: 1% prednisolone a couple times a week; Pataday Extra Strength (over the counter) daily     Subjective:  Allergies both eyes   Pt  Reports that his eyes become red swollen and itchy from the months of April to Sept. Patient states Pataday extra strength is no help. In 2018 he states he had a scratch in one eye.     Was seen by optometrist in [place] who prescribed prednsiolone drops. He uses it about 3 times per week.     Objective:  See Ophthalmology Exam.       Assessment:  Johnson Ayon is a 30 year old male who presents with:   Encounter Diagnosis   Name Primary?     Allergic conjunctivitis of both eyes        Plan:  Stop prednisolone drops    Start FML drops twice a day in both eyes     Continue Pataday extra strength once a day in both eyes     Erick Blas MD  [phone]

## 2021-08-11 NOTE — LETTER
8/11/2021         RE: Johnson Ayon  [address]  [place] [address]        Dear Colleague,    Thank you for referring your patient, Johnson Ayon, to the [facility]. Please see a copy of my visit note below.     Current Eye Medications: 1% prednisolone a couple times a week; Pataday Extra Strength (over the counter) daily     Subjective:  Allergies both eyes   Pt  Reports that his eyes become red swollen and itchy from the months of April to Sept. Patient states Pataday extra strength is no help. In 2018 he states he had a scratch in one eye.     Was seen by optometrist in [place] who prescribed prednsiolone drops. He uses it about 3 times per week.     Objective:  See Ophthalmology Exam.       Assessment:  Johnson Ayon is a 30 year old male who presents with:   Encounter Diagnosis   Name Primary?     Allergic conjunctivitis of both eyes        Plan:  Stop prednisolone drops    Start FML drops twice a day in both eyes     Continue Pataday extra strength once a day in both eyes     Erick Blas MD  [phone]          Again, thank you for allowing me to participate in the care of your patient.        Sincerely,        Erick Blas MD

## 2021-08-11 NOTE — PATIENT INSTRUCTIONS
Stop prednisolone drops    Start FML drops twice a day in both eyes     Continue Pataday extra strength once a day in both eyes     Erick Blas MD  [phone]

## 2021-09-01 ENCOUNTER — MYC MEDICAL ADVICE (OUTPATIENT)
Dept: OPHTHALMOLOGY | Facility: CLINIC | Age: 31
End: 2021-09-01

## 2021-09-01 DIAGNOSIS — H10.13 ALLERGIC CONJUNCTIVITIS OF BOTH EYES: Primary | ICD-10-CM

## 2021-09-01 RX ORDER — PREDNISOLONE ACETATE 10 MG/ML
1 SUSPENSION/ DROPS OPHTHALMIC EVERY OTHER DAY
Qty: 5 ML | Refills: 0 | Status: SHIPPED | OUTPATIENT
Start: 2021-09-01 | End: 2023-04-07

## 2021-09-01 NOTE — TELEPHONE ENCOUNTER
Filled one bottle of Prednisolone for patient to [facility] in [place] per Dr. Blas. Use sparingly in directions. Will see him on 9-17-21 for a follow up appt.

## 2021-09-03 DIAGNOSIS — I10 ESSENTIAL HYPERTENSION: ICD-10-CM

## 2021-09-03 RX ORDER — LOSARTAN POTASSIUM AND HYDROCHLOROTHIAZIDE 12.5; 5 MG/1; MG/1
TABLET ORAL
Qty: 90 TABLET | Refills: 0 | Status: SHIPPED | OUTPATIENT
Start: 2021-09-03 | End: 2022-01-04

## 2021-10-06 DIAGNOSIS — J45.40 MODERATE PERSISTENT ASTHMA WITHOUT COMPLICATION: ICD-10-CM

## 2021-10-10 ENCOUNTER — HEALTH MAINTENANCE LETTER (OUTPATIENT)
Age: 31
End: 2021-10-10

## 2021-10-15 ENCOUNTER — OFFICE VISIT (OUTPATIENT)
Dept: OPHTHALMOLOGY | Facility: CLINIC | Age: 31
End: 2021-10-15
Payer: COMMERCIAL

## 2021-10-15 DIAGNOSIS — H10.13 ALLERGIC CONJUNCTIVITIS OF BOTH EYES: Primary | ICD-10-CM

## 2021-10-15 PROCEDURE — 92012 INTRM OPH EXAM EST PATIENT: CPT | Performed by: STUDENT IN AN ORGANIZED HEALTH CARE EDUCATION/TRAINING PROGRAM

## 2021-10-15 RX ORDER — OLOPATADINE HYDROCHLORIDE 2 MG/ML
1 SOLUTION/ DROPS OPHTHALMIC DAILY
COMMUNITY
End: 2024-07-11

## 2021-10-15 ASSESSMENT — CUP TO DISC RATIO
OS_RATIO: 0.1
OD_RATIO: 0.1

## 2021-10-15 ASSESSMENT — TONOMETRY
OD_IOP_MMHG: 11
OS_IOP_MMHG: 11
IOP_METHOD: APPLANATION

## 2021-10-15 ASSESSMENT — VISUAL ACUITY
OD_CC: 20/20
OS_CC: 20/20
METHOD: SNELLEN - LINEAR
OD_CC+: -1

## 2021-10-15 ASSESSMENT — EXTERNAL EXAM - RIGHT EYE: OD_EXAM: NORMAL

## 2021-10-15 ASSESSMENT — SLIT LAMP EXAM - LIDS
COMMENTS: NORMAL
COMMENTS: NORMAL

## 2021-10-15 ASSESSMENT — EXTERNAL EXAM - LEFT EYE: OS_EXAM: NORMAL

## 2021-10-15 NOTE — LETTER
10/15/2021         RE: Johnson Ayon  [address]  [facility] [address]        Dear Colleague,    Thank you for referring your patient, Johnson Ayon, to the [facility]. Please see a copy of my visit note below.     Current Eye Medications: Pataday daily both eyes. Was taking FML twice a day both eyes, stopped about 4 weeks ago.      Subjective:  2 month follow up for IOP check. 3-4 weeks ago had flare up and Allergist gave steroid injection. Steroid injection helped a lot. Not having any symptoms for past 4 weeks. Vision fine both eyes. No eye pain or discomfort in either eye.      Objective:  See Ophthalmology Exam.      Assessment:  Johnson Ayon is a 31 year old male who presents with:   Encounter Diagnosis   Name Primary?     Allergic conjunctivitis of both eyes Improved after steroid shot from allergist. Also undergoing other allergy shots so hopefully less sensitized next season.        Plan:  Ok to use Pataday daily of both eyes     Ok to use FML twice a day both eyes as needed     If you start using the FML daily for more than one month, please return for eye pressure check  If you start using the prednisolone for more than 3 weeks, please return for eye pressure check    Erick Blas MD  [phone]          Again, thank you for allowing me to participate in the care of your patient.        Sincerely,        Erick Blas MD

## 2021-10-15 NOTE — PROGRESS NOTES
Current Eye Medications: Pataday daily both eyes. Was taking FML twice a day both eyes, stopped about 4 weeks ago.      Subjective:  2 month follow up for IOP check. 3-4 weeks ago had flare up and Allergist gave steroid injection. Steroid injection helped a lot. Not having any symptoms for past 4 weeks. Vision fine both eyes. No eye pain or discomfort in either eye.      Objective:  See Ophthalmology Exam.      Assessment:  Johnson Ayon is a 31 year old male who presents with:   Encounter Diagnosis   Name Primary?     Allergic conjunctivitis of both eyes Improved after steroid shot from allergist. Also undergoing other allergy shots so hopefully less sensitized next season.        Plan:  Ok to use Pataday daily of both eyes     Ok to use FML twice a day both eyes as needed     If you start using the FML daily for more than one month, please return for eye pressure check  If you start using the prednisolone for more than 3 weeks, please return for eye pressure check    Erick Blas MD  [phone]

## 2021-10-15 NOTE — PATIENT INSTRUCTIONS
Ok to use Pataday daily of both eyes     Ok to use FML twice a day both eyes as needed     If you start using the FML daily for more than one month, please return for eye pressure check  If you start using the prednisolone for more than 3 weeks, please return for eye pressure check    Erick Blas MD  [phone]

## 2021-10-19 PROBLEM — F32.9 MAJOR DEPRESSION: Status: ACTIVE | Noted: 2018-12-18

## 2022-01-03 DIAGNOSIS — I10 ESSENTIAL HYPERTENSION: ICD-10-CM

## 2022-01-03 DIAGNOSIS — J45.40 MODERATE PERSISTENT ASTHMA WITHOUT COMPLICATION: ICD-10-CM

## 2022-01-04 RX ORDER — LOSARTAN POTASSIUM AND HYDROCHLOROTHIAZIDE 12.5; 5 MG/1; MG/1
TABLET ORAL
Qty: 90 TABLET | Refills: 1 | Status: SHIPPED | OUTPATIENT
Start: 2022-01-04 | End: 2024-01-24

## 2022-01-04 RX ORDER — ALBUTEROL SULFATE 90 UG/1
2 AEROSOL, METERED RESPIRATORY (INHALATION) EVERY 6 HOURS PRN
Qty: 18 G | Refills: 1 | Status: SHIPPED | OUTPATIENT
Start: 2022-01-04 | End: 2023-03-28

## 2022-01-04 NOTE — TELEPHONE ENCOUNTER
Prescription approved per [facility] Refill Protocol.  Cornell CURIEL RN, BSN

## 2022-05-21 ENCOUNTER — HEALTH MAINTENANCE LETTER (OUTPATIENT)
Age: 32
End: 2022-05-21

## 2022-09-18 ENCOUNTER — HEALTH MAINTENANCE LETTER (OUTPATIENT)
Age: 32
End: 2022-09-18

## 2022-10-03 NOTE — TELEPHONE ENCOUNTER
Advair 250-50 diskus inhaler    Summary: Inhale 1 puff into the lungs every 12 hours, Disp-1 each, R-1, E-Prescribe   Dose, Route, Frequency: 1 puff, Inhalation, EVERY 12 HOURS  Start: 7/17/2021  Ord/Sold: 7/17/2021    Bactrim Pregnancy And Lactation Text: This medication is Pregnancy Category D and is known to cause fetal risk.  It is also excreted in breast milk.

## 2022-10-13 DIAGNOSIS — H10.13 ALLERGIC CONJUNCTIVITIS OF BOTH EYES: ICD-10-CM

## 2022-10-13 RX ORDER — FLUOROMETHOLONE 0.1 %
1 SUSPENSION, DROPS(FINAL DOSAGE FORM)(ML) OPHTHALMIC (EYE) 2 TIMES DAILY
Qty: 5 ML | Refills: 0 | Status: SHIPPED | OUTPATIENT
Start: 2022-10-13 | End: 2023-03-28

## 2023-03-27 ENCOUNTER — TELEPHONE (OUTPATIENT)
Dept: FAMILY MEDICINE | Facility: CLINIC | Age: 33
End: 2023-03-27
Payer: COMMERCIAL

## 2023-03-27 DIAGNOSIS — J45.40 MODERATE PERSISTENT ASTHMA WITHOUT COMPLICATION: ICD-10-CM

## 2023-03-28 DIAGNOSIS — H10.13 ALLERGIC CONJUNCTIVITIS OF BOTH EYES: ICD-10-CM

## 2023-03-28 RX ORDER — FLUOROMETHOLONE 0.1 %
1 SUSPENSION, DROPS(FINAL DOSAGE FORM)(ML) OPHTHALMIC (EYE) 2 TIMES DAILY
Qty: 5 ML | Refills: 0 | Status: SHIPPED | OUTPATIENT
Start: 2023-03-28 | End: 2024-07-11

## 2023-03-28 RX ORDER — ALBUTEROL SULFATE 90 UG/1
AEROSOL, METERED RESPIRATORY (INHALATION)
Qty: 6.7 G | Refills: 0 | Status: SHIPPED | OUTPATIENT
Start: 2023-03-28

## 2023-03-28 NOTE — TELEPHONE ENCOUNTER
Sent [facility] message asking pt to complete ACT and schedule appointment     Tabatha SCHUSTER, Triage RN  [facility]

## 2023-03-28 NOTE — TELEPHONE ENCOUNTER
Last appointment:  October of 2021.    No future appointments scheduled.    I will have Dr. Blas review.

## 2023-03-28 NOTE — TELEPHONE ENCOUNTER
Dr. Blas approves a 5mL bottle; no refills.  Appointment for a Comprehensive Eye Exam is needed for further refills.   Appointment reminder added to the refill approval.

## 2023-03-30 NOTE — TELEPHONE ENCOUNTER
[employer] message has not been read    Routing to [facility] to contact patient to inform due for appointment. Thank you.     Tabatha SCHUSTER, Triage RN  [facility]

## 2023-04-07 ENCOUNTER — MYC REFILL (OUTPATIENT)
Dept: OPHTHALMOLOGY | Facility: CLINIC | Age: 33
End: 2023-04-07
Payer: COMMERCIAL

## 2023-04-07 DIAGNOSIS — H10.13 ALLERGIC CONJUNCTIVITIS OF BOTH EYES: ICD-10-CM

## 2023-04-07 RX ORDER — FLUOROMETHOLONE 0.1 %
1 SUSPENSION, DROPS(FINAL DOSAGE FORM)(ML) OPHTHALMIC (EYE) 2 TIMES DAILY
Qty: 5 ML | Refills: 0 | Status: CANCELLED | OUTPATIENT
Start: 2023-04-07

## 2023-04-07 RX ORDER — PREDNISOLONE ACETATE 10 MG/ML
1 SUSPENSION/ DROPS OPHTHALMIC EVERY OTHER DAY
Qty: 5 ML | Refills: 0 | Status: SHIPPED | OUTPATIENT
Start: 2023-04-07 | End: 2024-07-11

## 2023-06-04 ENCOUNTER — HEALTH MAINTENANCE LETTER (OUTPATIENT)
Age: 33
End: 2023-06-04

## 2024-01-24 ENCOUNTER — OFFICE VISIT (OUTPATIENT)
Dept: FAMILY MEDICINE | Facility: CLINIC | Age: 34
End: 2024-01-24
Payer: COMMERCIAL

## 2024-01-24 VITALS
HEIGHT: 70 IN | RESPIRATION RATE: 18 BRPM | SYSTOLIC BLOOD PRESSURE: 107 MMHG | BODY MASS INDEX: 31.25 KG/M2 | HEART RATE: 63 BPM | WEIGHT: 218.3 LBS | TEMPERATURE: 97.9 F | DIASTOLIC BLOOD PRESSURE: 64 MMHG | OXYGEN SATURATION: 97 %

## 2024-01-24 DIAGNOSIS — I10 BENIGN ESSENTIAL HYPERTENSION: ICD-10-CM

## 2024-01-24 DIAGNOSIS — Z00.00 ROUTINE HISTORY AND PHYSICAL EXAMINATION OF ADULT: Primary | ICD-10-CM

## 2024-01-24 DIAGNOSIS — Z11.4 SCREENING FOR HIV (HUMAN IMMUNODEFICIENCY VIRUS): ICD-10-CM

## 2024-01-24 DIAGNOSIS — J45.40 MODERATE PERSISTENT ASTHMA WITHOUT COMPLICATION: ICD-10-CM

## 2024-01-24 DIAGNOSIS — Z11.59 NEED FOR HEPATITIS C SCREENING TEST: ICD-10-CM

## 2024-01-24 DIAGNOSIS — E78.5 DYSLIPIDEMIA: ICD-10-CM

## 2024-01-24 DIAGNOSIS — Z13.1 SCREENING FOR DIABETES MELLITUS: ICD-10-CM

## 2024-01-24 DIAGNOSIS — Z87.2 HISTORY OF ECZEMA: ICD-10-CM

## 2024-01-24 LAB
ALBUMIN SERPL BCG-MCNC: 4.5 G/DL (ref 3.5–5.2)
ALP SERPL-CCNC: 84 U/L (ref 40–150)
ALT SERPL W P-5'-P-CCNC: 56 U/L (ref 0–70)
ANION GAP SERPL CALCULATED.3IONS-SCNC: 9 MMOL/L (ref 7–15)
AST SERPL W P-5'-P-CCNC: 42 U/L (ref 0–45)
BILIRUB SERPL-MCNC: 0.8 MG/DL
BUN SERPL-MCNC: 11 MG/DL (ref 6–20)
CALCIUM SERPL-MCNC: 9.7 MG/DL (ref 8.6–10)
CHLORIDE SERPL-SCNC: 103 MMOL/L (ref 98–107)
CHOLEST SERPL-MCNC: 234 MG/DL
CREAT SERPL-MCNC: 1 MG/DL (ref 0.67–1.17)
CREAT UR-MCNC: 73.7 MG/DL
DEPRECATED HCO3 PLAS-SCNC: 26 MMOL/L (ref 22–29)
EGFRCR SERPLBLD CKD-EPI 2021: >90 ML/MIN/1.73M2
ERYTHROCYTE [DISTWIDTH] IN BLOOD BY AUTOMATED COUNT: 12.8 % (ref 10–15)
FASTING STATUS PATIENT QL REPORTED: YES
GLUCOSE SERPL-MCNC: 89 MG/DL (ref 70–99)
HBA1C MFR BLD: 5 % (ref 0–5.6)
HCT VFR BLD AUTO: 45.3 % (ref 40–53)
HCV AB SERPL QL IA: NONREACTIVE
HDLC SERPL-MCNC: 53 MG/DL
HGB BLD-MCNC: 14.8 G/DL (ref 13.3–17.7)
HIV 1+2 AB+HIV1 P24 AG SERPL QL IA: NONREACTIVE
LDLC SERPL CALC-MCNC: 154 MG/DL
MCH RBC QN AUTO: 28.7 PG (ref 26.5–33)
MCHC RBC AUTO-ENTMCNC: 32.7 G/DL (ref 31.5–36.5)
MCV RBC AUTO: 88 FL (ref 78–100)
MICROALBUMIN UR-MCNC: <12 MG/L
MICROALBUMIN/CREAT UR: NORMAL MG/G{CREAT}
NONHDLC SERPL-MCNC: 181 MG/DL
PLATELET # BLD AUTO: 285 10E3/UL (ref 150–450)
POTASSIUM SERPL-SCNC: 4.5 MMOL/L (ref 3.4–5.3)
PROT SERPL-MCNC: 7.5 G/DL (ref 6.4–8.3)
RBC # BLD AUTO: 5.15 10E6/UL (ref 4.4–5.9)
SODIUM SERPL-SCNC: 138 MMOL/L (ref 135–145)
TRIGL SERPL-MCNC: 135 MG/DL
WBC # BLD AUTO: 7.5 10E3/UL (ref 4–11)

## 2024-01-24 PROCEDURE — 99214 OFFICE O/P EST MOD 30 MIN: CPT | Mod: 25 | Performed by: INTERNAL MEDICINE

## 2024-01-24 PROCEDURE — 90480 ADMN SARSCOV2 VAC 1/ONLY CMP: CPT | Performed by: INTERNAL MEDICINE

## 2024-01-24 PROCEDURE — 85027 COMPLETE CBC AUTOMATED: CPT | Performed by: INTERNAL MEDICINE

## 2024-01-24 PROCEDURE — 80061 LIPID PANEL: CPT | Performed by: INTERNAL MEDICINE

## 2024-01-24 PROCEDURE — 87389 HIV-1 AG W/HIV-1&-2 AB AG IA: CPT | Performed by: INTERNAL MEDICINE

## 2024-01-24 PROCEDURE — 36415 COLL VENOUS BLD VENIPUNCTURE: CPT | Performed by: INTERNAL MEDICINE

## 2024-01-24 PROCEDURE — 83036 HEMOGLOBIN GLYCOSYLATED A1C: CPT | Performed by: INTERNAL MEDICINE

## 2024-01-24 PROCEDURE — 91320 SARSCV2 VAC 30MCG TRS-SUC IM: CPT | Performed by: INTERNAL MEDICINE

## 2024-01-24 PROCEDURE — 82043 UR ALBUMIN QUANTITATIVE: CPT | Performed by: INTERNAL MEDICINE

## 2024-01-24 PROCEDURE — 86803 HEPATITIS C AB TEST: CPT | Performed by: INTERNAL MEDICINE

## 2024-01-24 PROCEDURE — 90471 IMMUNIZATION ADMIN: CPT | Performed by: INTERNAL MEDICINE

## 2024-01-24 PROCEDURE — 80053 COMPREHEN METABOLIC PANEL: CPT | Performed by: INTERNAL MEDICINE

## 2024-01-24 PROCEDURE — 82570 ASSAY OF URINE CREATININE: CPT | Performed by: INTERNAL MEDICINE

## 2024-01-24 PROCEDURE — 99395 PREV VISIT EST AGE 18-39: CPT | Mod: 25 | Performed by: INTERNAL MEDICINE

## 2024-01-24 PROCEDURE — 90677 PCV20 VACCINE IM: CPT | Performed by: INTERNAL MEDICINE

## 2024-01-24 RX ORDER — AZITHROMYCIN 250 MG/1
TABLET, FILM COATED ORAL
COMMUNITY
Start: 2024-01-08

## 2024-01-24 RX ORDER — LOSARTAN POTASSIUM AND HYDROCHLOROTHIAZIDE 12.5; 5 MG/1; MG/1
TABLET ORAL
Qty: 90 TABLET | Refills: 1 | Status: SHIPPED | OUTPATIENT
Start: 2024-01-24 | End: 2024-08-12

## 2024-01-24 RX ORDER — MONTELUKAST SODIUM 10 MG/1
1 TABLET ORAL
COMMUNITY
Start: 2023-11-06

## 2024-01-24 RX ORDER — TRIAMCINOLONE ACETONIDE 1 MG/G
CREAM TOPICAL
COMMUNITY
Start: 2024-01-08

## 2024-01-24 ASSESSMENT — ASTHMA QUESTIONNAIRES
QUESTION_1 LAST FOUR WEEKS HOW MUCH OF THE TIME DID YOUR ASTHMA KEEP YOU FROM GETTING AS MUCH DONE AT WORK, SCHOOL OR AT HOME: NONE OF THE TIME
ACT_TOTALSCORE: 23
QUESTION_5 LAST FOUR WEEKS HOW WOULD YOU RATE YOUR ASTHMA CONTROL: WELL CONTROLLED
ACT_TOTALSCORE: 23
QUESTION_4 LAST FOUR WEEKS HOW OFTEN HAVE YOU USED YOUR RESCUE INHALER OR NEBULIZER MEDICATION (SUCH AS ALBUTEROL): ONCE A WEEK OR LESS
QUESTION_2 LAST FOUR WEEKS HOW OFTEN HAVE YOU HAD SHORTNESS OF BREATH: NOT AT ALL
QUESTION_3 LAST FOUR WEEKS HOW OFTEN DID YOUR ASTHMA SYMPTOMS (WHEEZING, COUGHING, SHORTNESS OF BREATH, CHEST TIGHTNESS OR PAIN) WAKE YOU UP AT NIGHT OR EARLIER THAN USUAL IN THE MORNING: NOT AT ALL

## 2024-01-24 ASSESSMENT — ENCOUNTER SYMPTOMS
DIARRHEA: 0
JOINT SWELLING: 0
SHORTNESS OF BREATH: 0
HEMATOCHEZIA: 0
PALPITATIONS: 0
ARTHRALGIAS: 0
SORE THROAT: 0
CONSTIPATION: 0
EYE PAIN: 0
COUGH: 0
MYALGIAS: 0
WEAKNESS: 0
FEVER: 0
ABDOMINAL PAIN: 0
FREQUENCY: 0
DYSURIA: 0
NAUSEA: 0
CHILLS: 0
PARESTHESIAS: 0
HEADACHES: 0
HEARTBURN: 0
HEMATURIA: 0
NERVOUS/ANXIOUS: 0
DIZZINESS: 0

## 2024-01-24 ASSESSMENT — PATIENT HEALTH QUESTIONNAIRE - PHQ9
10. IF YOU CHECKED OFF ANY PROBLEMS, HOW DIFFICULT HAVE THESE PROBLEMS MADE IT FOR YOU TO DO YOUR WORK, TAKE CARE OF THINGS AT HOME, OR GET ALONG WITH OTHER PEOPLE: NOT DIFFICULT AT ALL
SUM OF ALL RESPONSES TO PHQ QUESTIONS 1-9: 0
SUM OF ALL RESPONSES TO PHQ QUESTIONS 1-9: 0

## 2024-01-24 ASSESSMENT — PAIN SCALES - GENERAL: PAINLEVEL: NO PAIN (0)

## 2024-01-24 NOTE — PROGRESS NOTES
"Preventive Care Visit  [facility]  Devin Sheridan MD, Internal Medicine  Jan 24, 2024      SUBJECTIVE:   Igor is a 33 year old, presenting for the following:  Physical        1/24/2024     9:54 AM   Additional Questions   Roomed by leonor     Healthy Habits:     Getting at least 3 servings of Calcium per day:  Yes    Bi-annual eye exam:  Yes    Dental care twice a year:  Yes    Sleep apnea or symptoms of sleep apnea:  None    Diet:  Regular (no restrictions)    Frequency of exercise:  4-5 days/week    Duration of exercise:  30-45 minutes    Taking medications regularly:  Yes    Medication side effects:  Not applicable    Additional concerns today:  Yes    Today's PHQ-9 Score:       1/24/2024     9:34 AM   PHQ-9 SCORE   PHQ-9 Total Score MyChart 0   PHQ-9 Total Score 0         Have you ever done Advance Care Planning? (For example, a Health Directive, POLST, or a discussion with a medical provider or your loved ones about your wishes): No, advance care planning information given to patient to review.  Patient plans to discuss their wishes with loved ones or provider.      Social History     Tobacco Use    Smoking status: Never    Smokeless tobacco: Never   Substance Use Topics    Alcohol use: Yes     Comment: moderate , beer, 6 drinks a week. was 2-3 4 times a week             1/24/2024     9:36 AM   Alcohol Use   Prescreen: >3 drinks/day or >7 drinks/week? No          No data to display                Last PSA: No results found for: \"PSA\"    Reviewed orders with patient. Reviewed health maintenance and updated orders accordingly - Yes  Lab work is in process  Labs reviewed in EPIC  BP Readings from Last 3 Encounters:   01/24/24 107/64   07/23/21 136/76   05/14/21 (!) 146/89    Wt Readings from Last 3 Encounters:   01/24/24 99 kg (218 lb 4.8 oz)   07/23/21 98.4 kg (217 lb)   05/14/21 102.1 kg (225 lb)                  Patient Active Problem List   Diagnosis    Moderate persistent asthma without " complication    Mild major depression (H)     Past Surgical History:   Procedure Laterality Date    ADENOIDECTOMY      SINUS SURGERY  2007    septal deviation, adenoids       Social History     Tobacco Use    Smoking status: Never    Smokeless tobacco: Never   Substance Use Topics    Alcohol use: Yes     Comment: moderate , beer, 6 drinks a week. was 2-3 4 times a week     Family History   Problem Relation Age of Onset    Family History Negative Mother     Asthma Father     Asthma Sister     Asthma Brother     Cancer Maternal Grandfather         LIVER OR PANC    Brain Cancer Paternal Grandfather     Melanoma No family hx of     Skin Cancer No family hx of          Current Outpatient Medications   Medication Sig Dispense Refill    albuterol (PROAIR HFA/PROVENTIL HFA/VENTOLIN HFA) 108 (90 Base) MCG/ACT inhaler INHALE 2 PUFFS BY MOUTH EVERY 6 HOURS AS NEEDED FOR WHEEZE OR FOR SHORTNESS OF BREATH 6.7 g 0    azithromycin (ZITHROMAX) 250 MG tablet TAKE ONE TABLET BY MOUTH 3 TIMES A WEEK      fluticasone-salmeterol (ADVAIR) 250-50 MCG/DOSE inhaler Inhale 1 puff into the lungs every 12 hours 1 each 1    losartan-hydrochlorothiazide (HYZAAR) 50-12.5 MG tablet 1 tablet once daily, hold If dizzy or lightheaded 90 tablet 1    montelukast (SINGULAIR) 10 MG tablet Take 1 tablet by mouth daily at 2 pm      triamcinolone (KENALOG) 0.1 % external cream APPLY THIN COAT TO AFFECTED AREA TWICE A DAY      clindamycin (CLEOCIN T) 1 % external lotion Apply topically 2 times daily (Patient not taking: Reported on 1/24/2024) 120 mL 11    fluorometholone (FML LIQUIFILM) 0.1 % ophthalmic suspension Place 1 drop into both eyes 2 times daily An appointment with Dr. Blas is needed for any additional refills. (Patient not taking: Reported on 1/24/2024) 5 mL 0    olopatadine (PATADAY) 0.2 % ophthalmic solution Place 1 drop into both eyes daily (Patient not taking: Reported on 1/24/2024)      prednisoLONE acetate (PRED FORTE) 1 % ophthalmic  "suspension Place 1 drop into both eyes every other day Use sparingly (Patient not taking: Reported on 1/24/2024) 5 mL 0     Allergies   Allergen Reactions    Eggs [Chicken-Derived Products (Egg)] Nausea and Vomiting    Peanuts [Nuts] GI Disturbance     Recent Labs   Lab Test 07/23/21  1005 05/14/21  0943   * 125*   HDL 47 39*   TRIG 100 142   ALT  --  64   CR 1.09 1.03   GFRESTIMATED 90 >90   GFRESTBLACK  --  >90   POTASSIUM 4.5 4.7        Reviewed and updated as needed this visit by clinical staff    Allergies  Meds  Problems   Surg Hx           Reviewed and updated as needed this visit by Provider      Problems   Surg Hx           Past Medical History:   Diagnosis Date    Uncomplicated asthma       Past Surgical History:   Procedure Laterality Date    ADENOIDECTOMY      SINUS SURGERY  2007    septal deviation, adenoids     Review of Systems   Constitutional:  Negative for chills and fever.   HENT:  Negative for congestion, ear pain, hearing loss and sore throat.    Eyes:  Negative for pain and visual disturbance.   Respiratory:  Negative for cough and shortness of breath.    Cardiovascular:  Negative for chest pain and palpitations.   Gastrointestinal:  Negative for abdominal pain, constipation, diarrhea and nausea.   Genitourinary:  Negative for dysuria, frequency, genital sores, hematuria, penile discharge and urgency.   Musculoskeletal:  Negative for arthralgias, joint swelling and myalgias.   Skin:  Negative for rash.   Neurological:  Negative for dizziness, weakness and headaches.   Psychiatric/Behavioral:  The patient is not nervous/anxious.        Review of Systems  Constitutional, HEENT, cardiovascular, pulmonary, GI, , musculoskeletal, neuro, skin, endocrine and psych systems are negative, except as otherwise noted.    OBJECTIVE:   /64   Pulse 63   Temp 97.9  F (36.6  C) (Oral)   Resp 18   Ht 1.778 m (5' 10\")   Wt 99 kg (218 lb 4.8 oz)   SpO2 97%   BMI 31.32 kg/m     Estimated " "body mass index is 31.32 kg/m  as calculated from the following:    Height as of this encounter: 1.778 m (5' 10\").    Weight as of this encounter: 99 kg (218 lb 4.8 oz).  Physical Exam  GENERAL: alert and no distress  EYES: Eyes grossly normal to inspection, PERRL and conjunctivae and sclerae normal  HENT: ear canals and TM's normal, nose and mouth without ulcers or lesions  NECK: no adenopathy, no asymmetry, masses, or scars  RESP: lungs clear to auscultation - no rales, rhonchi or wheezes  CV: regular rate and rhythm, normal S1 S2, no S3 or S4, no murmur, click or rub, no peripheral edema  ABDOMEN: soft, nontender, no hepatosplenomegaly, no masses and bowel sounds normal  MS: no gross musculoskeletal defects noted, no edema  SKIN: no suspicious lesions or rashes  NEURO: Normal strength and tone, mentation intact and speech normal  PSYCH: mentation appears normal, affect normal/bright  LYMPH: no cervical, supraclavicular, axillary, or inguinal adenopathy    Diagnostic Test Results:  Labs reviewed in Epic    ASSESSMENT/PLAN:     Problem List Items Addressed This Visit       Moderate persistent asthma without complication    Relevant Medications    montelukast (SINGULAIR) 10 MG tablet     Other Visit Diagnoses       Routine history and physical examination of adult    -  Primary    Screening for HIV (human immunodeficiency virus)        Relevant Orders    HIV Antigen Antibody Combo    Need for hepatitis C screening test        Relevant Orders    Hepatitis C Screen Reflex to HCV RNA Quant and Genotype    Screening for diabetes mellitus        Relevant Orders    Hemoglobin A1c    Dyslipidemia        Repeat lipid panel.    Relevant Orders    Lipid panel reflex to direct LDL Fasting    Benign essential hypertension        Blood pressure goal less than 140/90.  Tolerating well antihypertensive medications.  Check labs.  Discussed side effects of medications.    Relevant Medications    losartan-hydrochlorothiazide (HYZAAR) " "50-12.5 MG tablet    Other Relevant Orders    CBC with platelets    Comprehensive metabolic panel (BMP + Alb, Alk Phos, ALT, AST, Total. Bili, TP)    Albumin Random Urine Quantitative with Creat Ratio    History of eczema        Apply topical triamcinolone follows with dermatology.            Patient has been advised of split billing requirements and indicates understanding: Yes      Counseling  Reviewed preventive health counseling, as reflected in patient instructions       Regular exercise       Healthy diet/nutrition       Vision screening       Hearing screening       Pneumococcal Vaccine          Alcohol Use        Family planning       Safe sex practices/STD prevention       Advance Care Planning      BMI  Estimated body mass index is 31.32 kg/m  as calculated from the following:    Height as of this encounter: 1.778 m (5' 10\").    Weight as of this encounter: 99 kg (218 lb 4.8 oz).   Weight management plan: Discussed healthy diet and exercise guidelines      He reports that he has never smoked. He has never used smokeless tobacco.            Signed Electronically by: Devin Sheridan MD  "

## 2024-01-24 NOTE — LETTER
My Asthma Action Plan    Name: Johnson Ayon   YOB: 1990  Date: 1/24/2024   My doctor: Devin Sheridan MD   My clinic: [facility]        My Control Medicine: Fluticasone propionate + salmeterol (Advair Diskus or Wixela Inhub) -  250/50 mcg BID  My Rescue Medicine: Albuterol (Proair/Ventolin/Proventil HFA) 2-4 puffs EVERY 4 HOURS as needed. Use a spacer if recommended by your provider.  My Oral Steroid Medicine: NONE My Asthma Severity:   Moderate Persistent  Know your asthma triggers: dust mites, pollens, animal dander, mold, and cold air  dust mites            GREEN ZONE   Good Control  I feel good  No cough or wheeze  Can work, sleep and play without asthma symptoms       Take your asthma control medicine every day.     If exercise triggers your asthma, take your rescue medication  15 minutes before exercise or sports, and  During exercise if you have asthma symptoms  Spacer to use with inhaler: If you have a spacer, make sure to use it with your inhaler             YELLOW ZONE Getting Worse  I have ANY of these:  I do not feel good  Cough or wheeze  Chest feels tight  Wake up at night   Keep taking your Green Zone medications  Start taking your rescue medicine:  every 20 minutes for up to 1 hour. Then every 4 hours for 24-48 hours.  If you stay in the Yellow Zone for more than 12-24 hours, contact your doctor.  If you do not return to the Green Zone in 12-24 hours or you get worse, start taking your oral steroid medicine if prescribed by your provider.           RED ZONE Medical Alert - Get Help  I have ANY of these:  I feel awful  Medicine is not helping  Breathing getting harder  Trouble walking or talking  Nose opens wide to breathe       Take your rescue medicine NOW  If your provider has prescribed an oral steroid medicine, start taking it NOW  Call your doctor NOW  If you are still in the Red Zone after 20 minutes and you have not reached your doctor:  Take your  rescue medicine again and  Call 911 or go to the emergency room right away    See your regular doctor within 2 weeks of an Emergency Room or Urgent Care visit for follow-up treatment.          Annual Reminders:  Meet with Asthma Educator,  Flu Shot in the Fall, consider Pneumonia Vaccination for patients with asthma (aged 19 and older).    Pharmacy:    [facility]ACY #[id number] - [facility] [address]  [facility] - [place], [place] - [address] [place] [address]/PHARMACY #[id number] - [address]    Electronically signed by Devin Sheridan MD   Date: 01/24/24                      Asthma Triggers  How To Control Things That Make Your Asthma Worse    Triggers are things that make your asthma worse.  Look at the list below to help you find your triggers and what you can do about them.  You can help prevent asthma flare-ups by staying away from your triggers.      Trigger                                                          What you can do   Cigarette Smoke  Tobacco smoke can make asthma worse. Do not allow smoking in your home, car or around you.  Be sure no one smokes at a child s day care or school.  If you smoke, ask your health care provider for ways to help you quit.  Ask family members to quit too.  Ask your health care provider for a referral to Quit Plan to help you quit smoking, or call 8-949-649-PLAN.     Colds, Flu, Bronchitis  These are common triggers of asthma. Wash your hands often.  Don t touch your eyes, nose or mouth.  Get a flu shot every year.     Dust Mites  These are tiny bugs that live in cloth or carpet. They are too small to see. Wash sheets and blankets in hot water every week.   Encase pillows and mattress in dust mite proof covers.  Avoid having carpet if you can. If you have carpet, vacuum weekly.   Use a dust mask and HEPA vacuum.   Pollen and Outdoor Mold  Some people are allergic to trees, grass, or weed pollen, or molds. Try to keep your  windows closed.  Limit time out doors when pollen count is high.   Ask you health care provider about taking medicine during allergy season.     Animal Dander  Some people are allergic to skin flakes, urine or saliva from pets with fur or feathers. Keep pets with fur or feathers out of your home.    If you can t keep the pet outdoors, then keep the pet out of your bedroom.  Keep the bedroom door closed.  Keep pets off cloth furniture and away from stuffed toys.     Mice, Rats, and Cockroaches   Some people are allergic to the waste from these pests.   Cover food and garbage.  Clean up spills and food crumbs.  Store grease in the refrigerator.   Keep food out of the bedroom.   Indoor Mold  This can be a trigger if your home has high moisture. Fix leaking faucets, pipes, or other sources of water.   Clean moldy surfaces.  Dehumidify basement if it is damp and smelly.   Smoke, Strong Odors, and Sprays  These can reduce air quality. Stay away from strong odors and sprays, such as perfume, powder, hair spray, paints, smoke incense, paint, cleaning products, candles and new carpet.   Exercise or Sports  Some people with asthma have this trigger. Be active!  Ask your doctor about taking medicine before sports or exercise to prevent symptoms.    Warm up for 5-10 minutes before and after sports or exercise.     Other Triggers of Asthma  Cold air:  Cover your nose and mouth with a scarf.  Sometimes laughing or crying can be a trigger.  Some medicines and food can trigger asthma.

## 2024-03-06 ENCOUNTER — E-VISIT (OUTPATIENT)
Dept: FAMILY MEDICINE | Facility: CLINIC | Age: 34
End: 2024-03-06
Payer: COMMERCIAL

## 2024-03-06 DIAGNOSIS — G47.00 INSOMNIA, UNSPECIFIED TYPE: Primary | ICD-10-CM

## 2024-03-06 PROCEDURE — 99421 OL DIG E/M SVC 5-10 MIN: CPT | Performed by: INTERNAL MEDICINE

## 2024-03-06 RX ORDER — ZOLPIDEM TARTRATE 5 MG/1
5 TABLET ORAL
Qty: 10 TABLET | Refills: 0 | Status: SHIPPED | OUTPATIENT
Start: 2024-03-06

## 2024-03-06 NOTE — PATIENT INSTRUCTIONS
Thank you for choosing us for your care. I have placed an order for a prescription so that you can start treatment. View your full visit summary for details by clicking on the link below. Your pharmacist will able to address any questions you may have about the medication.     If you're not feeling better within 5-7 days, please schedule an appointment.  You can schedule an appointment right here in [place], or call [phone]  If the visit is for the same symptoms as your eVisit, we'll refund the cost of your eVisit if seen within seven days.    Thank you for choosing us for your care. I think an in-clinic visit would be best next steps based on your symptoms. Please schedule a clinic appointment; you won t be charged for this eVisit.      You can schedule an appointment right here in [place], or call [phone]

## 2024-04-11 ENCOUNTER — MYC REFILL (OUTPATIENT)
Dept: OPHTHALMOLOGY | Facility: CLINIC | Age: 34
End: 2024-04-11
Payer: COMMERCIAL

## 2024-04-11 DIAGNOSIS — H10.13 ALLERGIC CONJUNCTIVITIS OF BOTH EYES: ICD-10-CM

## 2024-04-11 RX ORDER — PREDNISOLONE ACETATE 10 MG/ML
1 SUSPENSION/ DROPS OPHTHALMIC EVERY OTHER DAY
Qty: 5 ML | Refills: 0 | Status: CANCELLED | OUTPATIENT
Start: 2024-04-11

## 2024-07-11 ENCOUNTER — OFFICE VISIT (OUTPATIENT)
Dept: OPHTHALMOLOGY | Facility: CLINIC | Age: 34
End: 2024-07-11
Payer: COMMERCIAL

## 2024-07-11 DIAGNOSIS — H10.13 ALLERGIC CONJUNCTIVITIS OF BOTH EYES: Primary | ICD-10-CM

## 2024-07-11 PROCEDURE — 92012 INTRM OPH EXAM EST PATIENT: CPT | Performed by: STUDENT IN AN ORGANIZED HEALTH CARE EDUCATION/TRAINING PROGRAM

## 2024-07-11 RX ORDER — FLUOROMETHOLONE 0.1 %
1 SUSPENSION, DROPS(FINAL DOSAGE FORM)(ML) OPHTHALMIC (EYE) 2 TIMES DAILY
Qty: 5 ML | Refills: 2 | Status: SHIPPED | OUTPATIENT
Start: 2024-07-11

## 2024-07-11 RX ORDER — PREDNISOLONE ACETATE 10 MG/ML
1 SUSPENSION/ DROPS OPHTHALMIC EVERY OTHER DAY
Qty: 5 ML | Refills: 0 | Status: SHIPPED | OUTPATIENT
Start: 2024-07-11

## 2024-07-11 RX ORDER — OLOPATADINE HYDROCHLORIDE 2 MG/ML
1 SOLUTION/ DROPS OPHTHALMIC DAILY
Qty: 2.5 ML | Refills: 11 | Status: SHIPPED | OUTPATIENT
Start: 2024-07-11

## 2024-07-11 ASSESSMENT — EXTERNAL EXAM - LEFT EYE: OS_EXAM: NORMAL

## 2024-07-11 ASSESSMENT — VISUAL ACUITY
CORRECTION_TYPE: GLASSES
OD_CC+: -1
OS_CC: 20/25
METHOD: SNELLEN - LINEAR
OD_CC: 20/30

## 2024-07-11 ASSESSMENT — TONOMETRY
OS_IOP_MMHG: 12
OD_IOP_MMHG: 12
IOP_METHOD: APPLANATION

## 2024-07-11 ASSESSMENT — CUP TO DISC RATIO
OS_RATIO: 0.1
OD_RATIO: 0.1

## 2024-07-11 ASSESSMENT — EXTERNAL EXAM - RIGHT EYE: OD_EXAM: NORMAL

## 2024-07-11 ASSESSMENT — SLIT LAMP EXAM - LIDS
COMMENTS: NORMAL
COMMENTS: NORMAL

## 2024-07-11 NOTE — PATIENT INSTRUCTIONS
Ok to use Pataday daily of both eyes      Ok to use FML twice a day both eyes as needed     Use prednisolone drop very sparingly    All steroid drop use (prednisolone and FML) increases the risk that your eye pressure goes up and that cataract formation accelerates, so use as little as possible.    Erick Blas MD  [phone]

## 2024-07-11 NOTE — PROGRESS NOTES
Current Eye Medications:  FML 2-3 times left eye @ 8PM for one month now.     Subjective:  vision check and intraocular pressure per Dr. Blas.  Patient was on vacation last week, and his left eye became very inflamed (red, watery and swollen) so he was able to get some oral prednisone: 20 mg 2 tablets twice a day and now he is taking one pill a day. He states his left eye feels fine now, but he feels it's only because of the Prednisone.    His son is in the ER with a smashed finger, so he is not able to have a complete exam today.     Objective:  See Ophthalmology Exam.       Assessment:  Johnson Ayon is a 33 year old male who presents with:   Encounter Diagnosis   Name Primary?    Allergic conjunctivitis of both eyes Yes       Plan:  Ok to use Pataday daily of both eyes      Ok to use FML twice a day both eyes as needed     Use prednisolone drop very sparingly    All steroid drop use (prednisolone and FML) increases the risk that your eye pressure goes up and that cataract formation accelerates, so use as little as possible.    Erick Blas MD  [phone]

## 2024-07-11 NOTE — LETTER
7/11/2024      Johnson Ayon  [address]  [facility] [address]      Dear Colleague,    Thank you for referring your patient, Johnson Ayon, to the [facility]. Please see a copy of my visit note below.     Current Eye Medications:  FML 2-3 times left eye @ 8PM for one month now.     Subjective:  vision check and intraocular pressure per Dr. Blas.  Patient was on vacation last week, and his left eye became very inflamed (red, watery and swollen) so he was able to get some oral prednisone: 20 mg 2 tablets twice a day and now he is taking one pill a day. He states his left eye feels fine now, but he feels it's only because of the Prednisone.    His son is in the ER with a smashed finger, so he is not able to have a complete exam today.     Objective:  See Ophthalmology Exam.       Assessment:  Johnson Ayon is a 33 year old male who presents with:   Encounter Diagnosis   Name Primary?     Allergic conjunctivitis of both eyes Yes       Plan:  Ok to use Pataday daily of both eyes      Ok to use FML twice a day both eyes as needed     Use prednisolone drop very sparingly    All steroid drop use (prednisolone and FML) increases the risk that your eye pressure goes up and that cataract formation accelerates, so use as little as possible.    Erick Blas MD  [phone]       Again, thank you for allowing me to participate in the care of your patient.        Sincerely,        Erick Blas MD

## 2024-07-24 ENCOUNTER — OFFICE VISIT (OUTPATIENT)
Dept: FAMILY MEDICINE | Facility: CLINIC | Age: 34
End: 2024-07-24
Payer: COMMERCIAL

## 2024-07-24 VITALS
WEIGHT: 202.2 LBS | DIASTOLIC BLOOD PRESSURE: 82 MMHG | TEMPERATURE: 98.2 F | OXYGEN SATURATION: 100 % | SYSTOLIC BLOOD PRESSURE: 124 MMHG | HEIGHT: 70 IN | BODY MASS INDEX: 28.95 KG/M2 | RESPIRATION RATE: 18 BRPM

## 2024-07-24 DIAGNOSIS — L30.9 ECZEMA, UNSPECIFIED TYPE: ICD-10-CM

## 2024-07-24 DIAGNOSIS — E78.5 DYSLIPIDEMIA: ICD-10-CM

## 2024-07-24 DIAGNOSIS — J45.40 MODERATE PERSISTENT ASTHMA WITHOUT COMPLICATION: ICD-10-CM

## 2024-07-24 DIAGNOSIS — Z13.1 SCREENING FOR DIABETES MELLITUS: ICD-10-CM

## 2024-07-24 DIAGNOSIS — I10 PRIMARY HYPERTENSION: Primary | ICD-10-CM

## 2024-07-24 DIAGNOSIS — Z82.49: ICD-10-CM

## 2024-07-24 LAB
ALBUMIN SERPL BCG-MCNC: 4.7 G/DL (ref 3.5–5.2)
ALP SERPL-CCNC: 90 U/L (ref 40–150)
ALT SERPL W P-5'-P-CCNC: 54 U/L (ref 0–70)
ANION GAP SERPL CALCULATED.3IONS-SCNC: 12 MMOL/L (ref 7–15)
AST SERPL W P-5'-P-CCNC: 91 U/L (ref 0–45)
BILIRUB SERPL-MCNC: 1 MG/DL
BUN SERPL-MCNC: 12.4 MG/DL (ref 6–20)
CALCIUM SERPL-MCNC: 9.8 MG/DL (ref 8.8–10.4)
CHLORIDE SERPL-SCNC: 101 MMOL/L (ref 98–107)
CHOLEST SERPL-MCNC: 249 MG/DL
CREAT SERPL-MCNC: 0.97 MG/DL (ref 0.67–1.17)
EGFRCR SERPLBLD CKD-EPI 2021: >90 ML/MIN/1.73M2
FASTING STATUS PATIENT QL REPORTED: NO
FASTING STATUS PATIENT QL REPORTED: NO
GLUCOSE SERPL-MCNC: 81 MG/DL (ref 70–99)
HBA1C MFR BLD: 4.8 % (ref 0–5.6)
HCO3 SERPL-SCNC: 24 MMOL/L (ref 22–29)
HDLC SERPL-MCNC: 59 MG/DL
LDLC SERPL CALC-MCNC: 172 MG/DL
NONHDLC SERPL-MCNC: 190 MG/DL
POTASSIUM SERPL-SCNC: 4.3 MMOL/L (ref 3.4–5.3)
PROT SERPL-MCNC: 7.9 G/DL (ref 6.4–8.3)
SODIUM SERPL-SCNC: 137 MMOL/L (ref 135–145)
TRIGL SERPL-MCNC: 91 MG/DL

## 2024-07-24 PROCEDURE — 36415 COLL VENOUS BLD VENIPUNCTURE: CPT | Performed by: INTERNAL MEDICINE

## 2024-07-24 PROCEDURE — 99214 OFFICE O/P EST MOD 30 MIN: CPT | Performed by: INTERNAL MEDICINE

## 2024-07-24 PROCEDURE — 80061 LIPID PANEL: CPT | Performed by: INTERNAL MEDICINE

## 2024-07-24 PROCEDURE — 82088 ASSAY OF ALDOSTERONE: CPT | Performed by: INTERNAL MEDICINE

## 2024-07-24 PROCEDURE — 83036 HEMOGLOBIN GLYCOSYLATED A1C: CPT | Performed by: INTERNAL MEDICINE

## 2024-07-24 PROCEDURE — 84244 ASSAY OF RENIN: CPT | Mod: 90 | Performed by: INTERNAL MEDICINE

## 2024-07-24 PROCEDURE — 93000 ELECTROCARDIOGRAM COMPLETE: CPT | Performed by: INTERNAL MEDICINE

## 2024-07-24 PROCEDURE — 80053 COMPREHEN METABOLIC PANEL: CPT | Performed by: INTERNAL MEDICINE

## 2024-07-24 PROCEDURE — 99000 SPECIMEN HANDLING OFFICE-LAB: CPT | Performed by: INTERNAL MEDICINE

## 2024-07-24 ASSESSMENT — PATIENT HEALTH QUESTIONNAIRE - PHQ9
SUM OF ALL RESPONSES TO PHQ QUESTIONS 1-9: 0
SUM OF ALL RESPONSES TO PHQ QUESTIONS 1-9: 0
10. IF YOU CHECKED OFF ANY PROBLEMS, HOW DIFFICULT HAVE THESE PROBLEMS MADE IT FOR YOU TO DO YOUR WORK, TAKE CARE OF THINGS AT HOME, OR GET ALONG WITH OTHER PEOPLE: NOT DIFFICULT AT ALL

## 2024-07-24 ASSESSMENT — ASTHMA QUESTIONNAIRES
QUESTION_1 LAST FOUR WEEKS HOW MUCH OF THE TIME DID YOUR ASTHMA KEEP YOU FROM GETTING AS MUCH DONE AT WORK, SCHOOL OR AT HOME: NONE OF THE TIME
QUESTION_2 LAST FOUR WEEKS HOW OFTEN HAVE YOU HAD SHORTNESS OF BREATH: NOT AT ALL
ACT_TOTALSCORE: 24
QUESTION_4 LAST FOUR WEEKS HOW OFTEN HAVE YOU USED YOUR RESCUE INHALER OR NEBULIZER MEDICATION (SUCH AS ALBUTEROL): NOT AT ALL
QUESTION_5 LAST FOUR WEEKS HOW WOULD YOU RATE YOUR ASTHMA CONTROL: WELL CONTROLLED
ACT_TOTALSCORE: 24
QUESTION_3 LAST FOUR WEEKS HOW OFTEN DID YOUR ASTHMA SYMPTOMS (WHEEZING, COUGHING, SHORTNESS OF BREATH, CHEST TIGHTNESS OR PAIN) WAKE YOU UP AT NIGHT OR EARLIER THAN USUAL IN THE MORNING: NOT AT ALL

## 2024-07-24 ASSESSMENT — PAIN SCALES - GENERAL: PAINLEVEL: NO PAIN (0)

## 2024-07-24 NOTE — PROGRESS NOTES
"  Assessment & Plan   Problem List Items Addressed This Visit       Moderate persistent asthma without complication     Other Visit Diagnoses       Primary hypertension    -  Primary    Relevant Orders    Aldosterone    Renin activity    Aldosterone Renin Ratio    Comprehensive metabolic panel (BMP + Alb, Alk Phos, ALT, AST, Total. Bili, TP)    Family history of congenital long QT syndrome        Maternal first-degree cousin    Relevant Orders    EKG 12-lead complete w/read - Clinics (Completed)    Dyslipidemia        Relevant Orders    Lipid panel reflex to direct LDL Fasting    Screening for diabetes mellitus        Relevant Orders    Hemoglobin A1c    Eczema, unspecified type        Applies moisturizer and triamcinolone             No family history of sudden cardiac death.  First-degree relatives with history of long QT syndrome.  Check baseline EKG patient reports had an EKG as a child was normal.  Denies any palpitations or chest symptoms.  He is using his Advair and rescue inhaler sparingly.  Follows with allergy specialist.  He does have eczema flare on his upper back use triamcinolone moisturizer.  He knows about the allergy or sensitivity to aspirin.  Will check basic labs including chemistry aldosterone level baseline EKG lipid panel he does have elevated LDL.  Low saturated fat low-cholesterol diet he lost some 617 pounds over the last year congratulated patient on such she does a lot of running.  No symptoms with exertion he does get some exercise induced bronchospasm when it is cold.     BMI  Estimated body mass index is 29.01 kg/m  as calculated from the following:    Height as of this encounter: 1.778 m (5' 10\").    Weight as of this encounter: 91.7 kg (202 lb 3.2 oz).   Weight management plan: Discussed healthy diet and exercise guidelines    Work on weight loss  Regular exercise  See Patient Instructions      Subjective   Igor is a 33 year old, presenting for the following health issues:  Follow " "Up        7/24/2024    11:28 AM   Additional Questions   Roomed by Ady   Accompanied by Not applicable, by themselves     History of Present Illness       Hypertension: He presents for follow up of hypertension.  He does not check blood pressure  regularly outside of the clinic. Outside blood pressures have been over 140/90. He does not follow a low salt diet.     He eats 2-3 servings of fruits and vegetables daily.He consumes 0 sweetened beverage(s) daily.He exercises with enough effort to increase his heart rate 30 to 60 minutes per day.  He exercises with enough effort to increase his heart rate 6 days per week. He is missing 1 dose(s) of medications per week.         Review of Systems  Constitutional, HEENT, cardiovascular, pulmonary, gi and gu systems are negative, except as otherwise noted.      Objective    /82 (BP Location: Left arm, Cuff Size: Adult Large)   Temp 98.2  F (36.8  C) (Temporal)   Resp 18   Ht 1.778 m (5' 10\")   Wt 91.7 kg (202 lb 3.2 oz)   SpO2 100%   BMI 29.01 kg/m    Body mass index is 29.01 kg/m .  Physical Exam   GENERAL: alert and no distress  EYES: Eyes grossly normal to inspection, PERRL and conjunctivae and sclerae normal  HENT: ear canals and TM's normal, nose and mouth without ulcers or lesions  NECK: no adenopathy, no asymmetry, masses, or scars  RESP: lungs clear to auscultation - no rales, rhonchi or wheezes  CV: regular rate and rhythm, normal S1 S2, no S3 or S4, no murmur, click or rub, no peripheral edema  ABDOMEN: soft, nontender, no hepatosplenomegaly, no masses and bowel sounds normal  MS: no gross musculoskeletal defects noted, no edema  SKIN: no suspicious lesions or rashes  NEURO: Normal strength and tone, mentation intact and speech normal  PSYCH: mentation appears normal, affect normal/bright    Office Visit on 01/24/2024   Component Date Value Ref Range Status    WBC Count 01/24/2024 7.5  4.0 - 11.0 10e3/uL Final    RBC Count 01/24/2024 5.15  4.40 - " 5.90 10e6/uL Final    Hemoglobin 01/24/2024 14.8  13.3 - 17.7 g/dL Final    Hematocrit 01/24/2024 45.3  40.0 - 53.0 % Final    MCV 01/24/2024 88  78 - 100 fL Final    MCH 01/24/2024 28.7  26.5 - 33.0 pg Final    MCHC 01/24/2024 32.7  31.5 - 36.5 g/dL Final    RDW 01/24/2024 12.8  10.0 - 15.0 % Final    Platelet Count 01/24/2024 285  150 - 450 10e3/uL Final    Sodium 01/24/2024 138  135 - 145 mmol/L Final    Reference intervals for this test were updated on 09/26/2023 to more accurately reflect our healthy population. There may be differences in the flagging of prior results with similar values performed with this method. Interpretation of those prior results can be made in the context of the updated reference intervals.     Potassium 01/24/2024 4.5  3.4 - 5.3 mmol/L Final    Carbon Dioxide (CO2) 01/24/2024 26  22 - 29 mmol/L Final    Anion Gap 01/24/2024 9  7 - 15 mmol/L Final    Urea Nitrogen 01/24/2024 11.0  6.0 - 20.0 mg/dL Final    Creatinine 01/24/2024 1.00  0.67 - 1.17 mg/dL Final    GFR Estimate 01/24/2024 >90  >60 mL/min/1.73m2 Final    Calcium 01/24/2024 9.7  8.6 - 10.0 mg/dL Final    Chloride 01/24/2024 103  98 - 107 mmol/L Final    Glucose 01/24/2024 89  70 - 99 mg/dL Final    Alkaline Phosphatase 01/24/2024 84  40 - 150 U/L Final    Reference intervals for this test were updated on 11/14/2023 to more accurately reflect our healthy population. There may be differences in the flagging of prior results with similar values performed with this method. Interpretation of those prior results can be made in the context of the updated reference intervals.    AST 01/24/2024 42  0 - 45 U/L Final    Reference intervals for this test were updated on 6/12/2023 to more accurately reflect our healthy population. There may be differences in the flagging of prior results with similar values performed with this method. Interpretation of those prior results can be made in the context of the updated reference intervals.     ALT 01/24/2024 56  0 - 70 U/L Final    Reference intervals for this test were updated on 6/12/2023 to more accurately reflect our healthy population. There may be differences in the flagging of prior results with similar values performed with this method. Interpretation of those prior results can be made in the context of the updated reference intervals.      Protein Total 01/24/2024 7.5  6.4 - 8.3 g/dL Final    Albumin 01/24/2024 4.5  3.5 - 5.2 g/dL Final    Bilirubin Total 01/24/2024 0.8  <=1.2 mg/dL Final    Cholesterol 01/24/2024 234 (H)  <200 mg/dL Final    Triglycerides 01/24/2024 135  <150 mg/dL Final    Direct Measure HDL 01/24/2024 53  >=40 mg/dL Final    LDL Cholesterol Calculated 01/24/2024 154 (H)  <=100 mg/dL Final    Non HDL Cholesterol 01/24/2024 181 (H)  <130 mg/dL Final    Patient Fasting > 8hrs? 01/24/2024 Yes   Final    Creatinine Urine mg/dL 01/24/2024 73.7  mg/dL Final    The reference ranges have not been established in urine creatinine. The results should be integrated into the clinical context for interpretation.    Albumin Urine mg/L 01/24/2024 <12.0  mg/L Final    The reference ranges have not been established in urine albumin. The results should be integrated into the clinical context for interpretation.    Albumin Urine mg/g Cr 01/24/2024    Final    Unable to calculate, urine albumin and/or urine creatinine is outside detectable limits.  Microalbuminuria is defined as an albumin:creatinine ratio of 17 to 299 for males and 25 to 299 for females. A ratio of albumin:creatinine of 300 or higher is indicative of overt proteinuria.  Due to biologic variability, positive results should be confirmed by a second, first-morning random or 24-hour timed urine specimen. If there is discrepancy, a third specimen is recommended. When 2 out of 3 results are in the microalbuminuria range, this is evidence for incipient nephropathy and warrants increased efforts at glucose control, blood pressure control,  and institution of therapy with an angiotensin-converting-enzyme (ACE) inhibitor (if the patient can tolerate it).      Hemoglobin A1C 01/24/2024 5.0  0.0 - 5.6 % Final    Normal <5.7%   Prediabetes 5.7-6.4%    Diabetes 6.5% or higher     Note: Adopted from ADA consensus guidelines.    HIV Antigen Antibody Combo 01/24/2024 Nonreactive  Nonreactive Final    Negative HIV-1/-2 antigen and antibody screening test results usually indicate the absence of HIV-1 and HIV-2 infection. However, such negative results do not rule-out acute HIV infection.  If acute HIV-1 or HIV-2 infection is suspected, detection of HIV-1 or HIV-2 RNA  is recommended.     Hepatitis C Antibody 01/24/2024 Nonreactive  Nonreactive Final    A nonreactive screening test result does not exclude the possibility of exposure to or infection with HCV. Nonreactive screening test results in individuals with prior exposure to HCV may be due to antibody levels below the limit of detection of this assay or lack of reactivity to the HCV antigens used in this assay. Patients with recent HCV infections (<3 months from time of exposure) may have false-negative HCV antibody results due to the time needed for seroconversion (average of 8 to 9 weeks).           Signed Electronically by: Devin Sheridan MD

## 2024-07-25 DIAGNOSIS — R74.01 ELEVATED AST (SGOT): Primary | ICD-10-CM

## 2024-07-26 LAB
ALDOST SERPL-MCNC: 4.7 NG/DL (ref 0–31)
RENIN PLAS-CCNC: 25.9 NG/ML/HR

## 2024-07-29 LAB — ALDOST/RENIN PLAS-RTO: 0.2 {RATIO} (ref 0–25)

## 2024-08-11 DIAGNOSIS — I10 ESSENTIAL (PRIMARY) HYPERTENSION: ICD-10-CM

## 2024-08-12 RX ORDER — LOSARTAN POTASSIUM AND HYDROCHLOROTHIAZIDE 12.5; 5 MG/1; MG/1
TABLET ORAL
Qty: 90 TABLET | Refills: 2 | Status: SHIPPED | OUTPATIENT
Start: 2024-08-12

## 2024-08-20 ENCOUNTER — LAB (OUTPATIENT)
Dept: LAB | Facility: CLINIC | Age: 34
End: 2024-08-20
Payer: COMMERCIAL

## 2024-08-20 DIAGNOSIS — R74.01 ELEVATED AST (SGOT): ICD-10-CM

## 2024-08-20 LAB
ALBUMIN SERPL BCG-MCNC: 4.3 G/DL (ref 3.5–5.2)
ALP SERPL-CCNC: 83 U/L (ref 40–150)
ALT SERPL W P-5'-P-CCNC: 24 U/L (ref 0–70)
AST SERPL W P-5'-P-CCNC: 32 U/L (ref 0–45)
BILIRUB DIRECT SERPL-MCNC: <0.2 MG/DL (ref 0–0.3)
BILIRUB SERPL-MCNC: 0.3 MG/DL
PROT SERPL-MCNC: 7 G/DL (ref 6.4–8.3)

## 2024-08-20 PROCEDURE — 80076 HEPATIC FUNCTION PANEL: CPT

## 2024-08-20 PROCEDURE — 36415 COLL VENOUS BLD VENIPUNCTURE: CPT

## 2025-01-24 PROBLEM — F32.9 MAJOR DEPRESSION: Status: RESOLVED | Noted: 2018-12-18 | Resolved: 2025-01-24

## 2025-02-05 ENCOUNTER — VIRTUAL VISIT (OUTPATIENT)
Dept: UROLOGY | Facility: CLINIC | Age: 35
End: 2025-02-05
Attending: INTERNAL MEDICINE
Payer: COMMERCIAL

## 2025-02-05 DIAGNOSIS — F41.9 ANXIETY DUE TO INVASIVE PROCEDURE: Primary | ICD-10-CM

## 2025-02-05 DIAGNOSIS — Z30.2 ENCOUNTER FOR VASECTOMY: ICD-10-CM

## 2025-02-05 NOTE — PROGRESS NOTES
VASECTOMY CONSULTATION NOTE  DATE OF VISIT: 2/5/2025  PATIENT NAME: Johnson Ayon    YOB: 1990      REASON FOR CONSULTATION: Mr. Johnson Ayon is a 34 year old year old gentleman who is seen today requesting a vasectomy as a form of permanent birth control.     He has two boys, ages 4 and 6.    No prior scrotal/inguinal surgeries, he does not take blood thinners, no hx diabetes, he does not feel his scrotum is tight to his body.    PAST MEDICAL HISTORY:   Past Medical History:   Diagnosis Date    Mild major depression (H) 12/18/2018    Uncomplicated asthma        PAST SURGICAL HISTORY:   Past Surgical History:   Procedure Laterality Date    ADENOIDECTOMY      SINUS SURGERY  2007    septal deviation, adenoids       MEDICATIONS:   Current Outpatient Medications:     albuterol (PROAIR HFA/PROVENTIL HFA/VENTOLIN HFA) 108 (90 Base) MCG/ACT inhaler, INHALE 2 PUFFS BY MOUTH EVERY 6 HOURS AS NEEDED FOR WHEEZE OR FOR SHORTNESS OF BREATH, Disp: 6.7 g, Rfl: 0    azithromycin (ZITHROMAX) 250 MG tablet, TAKE ONE TABLET BY MOUTH 3 TIMES A WEEK (Patient not taking: Reported on 1/24/2025), Disp: , Rfl:     clindamycin (CLEOCIN T) 1 % external lotion, Apply topically 2 times daily (Patient not taking: Reported on 1/24/2025), Disp: 120 mL, Rfl: 11    DUPIXENT 300 MG/2ML prefilled pen, , Disp: , Rfl:     fluorometholone (FML LIQUIFILM) 0.1 % ophthalmic suspension, Place 1 drop into both eyes 2 times daily (Patient not taking: Reported on 1/24/2025), Disp: 5 mL, Rfl: 2    fluticasone-salmeterol (ADVAIR) 250-50 MCG/DOSE inhaler, Inhale 1 puff into the lungs every 12 hours, Disp: 1 each, Rfl: 1    losartan-hydrochlorothiazide (HYZAAR) 50-12.5 MG tablet, TAKE 1 TABLET BY MOUTH EVERY DAY HOLD IF DIZZY OR LIGHTHEADED (Patient not taking: Reported on 1/24/2025), Disp: 90 tablet, Rfl: 2    montelukast (SINGULAIR) 10 MG tablet, Take 1 tablet by mouth daily at 2 pm (Patient not taking: Reported on 1/24/2025), Disp: ,  Rfl:     olopatadine (PATADAY) 0.2 % ophthalmic solution, Place 0.05 mLs (1 drop) into both eyes daily (Patient not taking: Reported on 1/24/2025), Disp: 2.5 mL, Rfl: 11    prednisoLONE acetate (PRED FORTE) 1 % ophthalmic suspension, Place 1 drop into both eyes every other day Use sparingly, Disp: 5 mL, Rfl: 0    triamcinolone (KENALOG) 0.1 % external cream, APPLY THIN COAT TO AFFECTED AREA TWICE A DAY (Patient not taking: Reported on 1/24/2025), Disp: , Rfl:     zolpidem (AMBIEN) 5 MG tablet, Take 1 tablet (5 mg) by mouth nightly as needed for sleep (Patient not taking: Reported on 1/24/2025), Disp: 10 tablet, Rfl: 0    ALLERGIES:   Allergies   Allergen Reactions    Eggs [Chicken-Derived Products (Egg)] Nausea and Vomiting    Peanuts [Nuts] GI Disturbance       FAMILY HISTORY:   Family History   Problem Relation Age of Onset    Family History Negative Mother     Asthma Father     Asthma Sister     Asthma Brother     Cancer Maternal Grandfather         LIVER OR PANC    Brain Cancer Paternal Grandfather     Melanoma No family hx of     Skin Cancer No family hx of        SOCIAL HISTORY:   Social History     Socioeconomic History    Marital status:      Spouse name: Not on file    Number of children: Not on file    Years of education: Not on file    Highest education level: Not on file   Occupational History    Not on file   Tobacco Use    Smoking status: Never    Smokeless tobacco: Never   Vaping Use    Vaping status: Never Used   Substance and Sexual Activity    Alcohol use: Yes     Comment: moderate , beer, 6 drinks a week. was 2-3 4 times a week    Drug use: No    Sexual activity: Yes     Partners: Female   Other Topics Concern    Parent/sibling w/ CABG, MI or angioplasty before 65F 55M? No   Social History Narrative    Not on file     Social Drivers of Health     Financial Resource Strain: Low Risk  (1/24/2025)    Financial Resource Strain     Within the past 12 months, have you or your family members you  live with been unable to get utilities (heat, electricity) when it was really needed?: No   Food Insecurity: Low Risk  (1/24/2025)    Food Insecurity     Within the past 12 months, did you worry that your food would run out before you got money to buy more?: No     Within the past 12 months, did the food you bought just not last and you didn t have money to get more?: No   Transportation Needs: Low Risk  (1/24/2025)    Transportation Needs     Within the past 12 months, has lack of transportation kept you from medical appointments, getting your medicines, non-medical meetings or appointments, work, or from getting things that you need?: No   Physical Activity: Unknown (1/24/2025)    Exercise Vital Sign     Days of Exercise per Week: 6 days     Minutes of Exercise per Session: Not on file   Stress: No Stress Concern Present (1/24/2025)    [place] [employer] - Occupational Stress Questionnaire     Feeling of Stress : Only a little   Social Connections: Unknown (1/24/2025)    Social Connection and Isolation Panel [NHANES]     Frequency of Communication with Friends and Family: Not on file     Frequency of Social Gatherings with Friends and Family: More than three times a week     Attends Nondenominational Services: Not on file     Active Member of Clubs or Organizations: Not on file     Attends Club or Organization Meetings: Not on file     Marital Status: Not on file   Interpersonal Safety: Low Risk  (1/24/2025)    Interpersonal Safety     Do you feel physically and emotionally safe where you currently live?: Yes     Within the past 12 months, have you been hit, slapped, kicked or otherwise physically hurt by someone?: No     Within the past 12 months, have you been humiliated or emotionally abused in other ways by your partner or ex-partner?: No   Housing Stability: Low Risk  (1/24/2025)    Housing Stability     Do you have housing? : Yes     Are you worried about losing your housing?: No       Due to  the nature of this video visit, no physical exam was performed.     DIAGNOSIS: Request for sterilization    PLAN: The risks of the procedure as well as expectations for recovery and outcomes were explained in detail to him.  He was counseled on the risks for bleeding infection and pain after the procedure. We discussed the risk of post-vasectomy pain syndrome.  He was instructed to continue to use contraception until he had proven azoospermia on a semen specimen.  This would normally be collected at least 3 months after the procedure. Also discussed the rare, but possible risk of re-canalization of the vas, even after successful vasectomy with sterile semen specimen.  He was instructed to hold all anticoagulants medications for one week prior to the procedure.  It was recommended that he have someone else drive him home after his vasectomy.  In light of these risks and expectations he would like to proceed.  We are scheduling a vasectomy in the office in the near future.    Pt. Understands:  -1/1000-1/3000 risk of future pregnancy even with perfectly done vasectomy  -vasectomy is a permanent procedure    -he may cryopreserve sperm if he wishes   -1-5% risk of post-vasectomy pain syndrome   -1-5% risk of complication, primarily infection or bleeding  - he needs to have a semen sample that shows no sperm before getting approval for unprotected intercourse.      He would like to take 5 mg Valium pre-procedure. He will pick this medication up from his local pharmacy and arrive to clinic 1 hour prior to his vasectomy to sign the consent, then take the medication in our waiting area. He understands he must have a .     Thank you for the kind consultation.    15 minutes spent on the date of the encounter, including direct interaction with the patient, performing chart review, documentation and further activities as noted above.    Video-Visit Details  The patient consents to today's video visit: yes    Type of  service:  Video Visit    Video Start Time:  4:31 PM    Video End Time:4:42 PM    Originating Location (pt. Location): Home    Distant Location (provider location):  [facility]    Platform used for Video Visit: Verona Rome MD   Urology  [facility]  Clinic Phone [phone]

## 2025-02-05 NOTE — Clinical Note
Hi Dr. Sheridan-  I saw Igor last week. I will get him in for an office vasectomy soon.  Please don't hesitate to contact me with further questions. I greatly appreciate the referral.   Edwige Rome MD Cell: [phone]

## 2025-02-05 NOTE — PROGRESS NOTES
"Virtual Visit Details    Type of service:  Video Visit     Originating Location (pt. Location): {video visit patient location:790996::\"Home\"}  {PROVIDER LOCATION On-site should be selected for visits conducted from your clinic location or adjoining [facility] hospital, academic office, or other nearby [facility] building. Off-site should be selected for all other provider locations, including home:[id number]}  Distant Location (provider location):  {virtual location provider:694085}  Platform used for Video Visit: {Virtual Visit Platforms:064736::\"[employer]\"}  "

## 2025-02-05 NOTE — NURSING NOTE
Current patient location: [address] [address]    Is the patient currently in the state of [place]? YES    Visit mode: VIDEO    If the visit is dropped, the patient can be reconnected by:VIDEO VISIT: Text to cell phone:   Telephone Information:   Mobile [phone]    and VIDEO VISIT: Send to e-mail at: [email or web address]    Will anyone else be joining the visit? NO  (If patient encounters technical issues they should call [phone])    Are changes needed to the allergy or medication list? Pt stated no med changes    Are refills needed on medications prescribed by this physician? NO    Rooming Documentation:  Not applicable    Reason for visit: Consult (Vasectomy consult )    Abbi RUSSO

## 2025-02-05 NOTE — LETTER
2/5/2025       RE: Johnson Ayon  [address] [facility] [address]     Dear Colleague,    Thank you for referring your patient, Johnson Ayon, to the [facility] at [facility]. Please see a copy of my visit note below.    VASECTOMY CONSULTATION NOTE  DATE OF VISIT: 2/5/2025  PATIENT NAME: Johnson Ayon    YOB: 1990      REASON FOR CONSULTATION: Mr. Johnson Ayon is a 34 year old year old gentleman who is seen today requesting a vasectomy as a form of permanent birth control.     He has two boys, ages 4 and 6.    No prior scrotal/inguinal surgeries, he does not take blood thinners, no hx diabetes, he does not feel his scrotum is tight to his body.    PAST MEDICAL HISTORY:   Past Medical History:   Diagnosis Date     Mild major depression (H) 12/18/2018     Uncomplicated asthma        PAST SURGICAL HISTORY:   Past Surgical History:   Procedure Laterality Date     ADENOIDECTOMY       SINUS SURGERY  2007    septal deviation, adenoids       MEDICATIONS:   Current Outpatient Medications:      albuterol (PROAIR HFA/PROVENTIL HFA/VENTOLIN HFA) 108 (90 Base) MCG/ACT inhaler, INHALE 2 PUFFS BY MOUTH EVERY 6 HOURS AS NEEDED FOR WHEEZE OR FOR SHORTNESS OF BREATH, Disp: 6.7 g, Rfl: 0     azithromycin (ZITHROMAX) 250 MG tablet, TAKE ONE TABLET BY MOUTH 3 TIMES A WEEK (Patient not taking: Reported on 1/24/2025), Disp: , Rfl:      clindamycin (CLEOCIN T) 1 % external lotion, Apply topically 2 times daily (Patient not taking: Reported on 1/24/2025), Disp: 120 mL, Rfl: 11     DUPIXENT 300 MG/2ML prefilled pen, , Disp: , Rfl:      fluorometholone (FML LIQUIFILM) 0.1 % ophthalmic suspension, Place 1 drop into both eyes 2 times daily (Patient not taking: Reported on 1/24/2025), Disp: 5 mL, Rfl: 2     fluticasone-salmeterol (ADVAIR) 250-50 MCG/DOSE inhaler, Inhale 1 puff into the lungs every 12 hours, Disp: 1 each,  Rfl: 1     losartan-hydrochlorothiazide (HYZAAR) 50-12.5 MG tablet, TAKE 1 TABLET BY MOUTH EVERY DAY HOLD IF DIZZY OR LIGHTHEADED (Patient not taking: Reported on 1/24/2025), Disp: 90 tablet, Rfl: 2     montelukast (SINGULAIR) 10 MG tablet, Take 1 tablet by mouth daily at 2 pm (Patient not taking: Reported on 1/24/2025), Disp: , Rfl:      olopatadine (PATADAY) 0.2 % ophthalmic solution, Place 0.05 mLs (1 drop) into both eyes daily (Patient not taking: Reported on 1/24/2025), Disp: 2.5 mL, Rfl: 11     prednisoLONE acetate (PRED FORTE) 1 % ophthalmic suspension, Place 1 drop into both eyes every other day Use sparingly, Disp: 5 mL, Rfl: 0     triamcinolone (KENALOG) 0.1 % external cream, APPLY THIN COAT TO AFFECTED AREA TWICE A DAY (Patient not taking: Reported on 1/24/2025), Disp: , Rfl:      zolpidem (AMBIEN) 5 MG tablet, Take 1 tablet (5 mg) by mouth nightly as needed for sleep (Patient not taking: Reported on 1/24/2025), Disp: 10 tablet, Rfl: 0    ALLERGIES:   Allergies   Allergen Reactions     Eggs [Chicken-Derived Products (Egg)] Nausea and Vomiting     Peanuts [Nuts] GI Disturbance       FAMILY HISTORY:   Family History   Problem Relation Age of Onset     Family History Negative Mother      Asthma Father      Asthma Sister      Asthma Brother      Cancer Maternal Grandfather         LIVER OR PANC     Brain Cancer Paternal Grandfather      Melanoma No family hx of      Skin Cancer No family hx of        SOCIAL HISTORY:   Social History     Socioeconomic History     Marital status:      Spouse name: Not on file     Number of children: Not on file     Years of education: Not on file     Highest education level: Not on file   Occupational History     Not on file   Tobacco Use     Smoking status: Never     Smokeless tobacco: Never   Vaping Use     Vaping status: Never Used   Substance and Sexual Activity     Alcohol use: Yes     Comment: moderate , beer, 6 drinks a week. was 2-3 4 times a week     Drug use:  No     Sexual activity: Yes     Partners: Female   Other Topics Concern     Parent/sibling w/ CABG, MI or angioplasty before 65F 55M? No   Social History Narrative     Not on file     Social Drivers of Health     Financial Resource Strain: Low Risk  (1/24/2025)    Financial Resource Strain      Within the past 12 months, have you or your family members you live with been unable to get utilities (heat, electricity) when it was really needed?: No   Food Insecurity: Low Risk  (1/24/2025)    Food Insecurity      Within the past 12 months, did you worry that your food would run out before you got money to buy more?: No      Within the past 12 months, did the food you bought just not last and you didn t have money to get more?: No   Transportation Needs: Low Risk  (1/24/2025)    Transportation Needs      Within the past 12 months, has lack of transportation kept you from medical appointments, getting your medicines, non-medical meetings or appointments, work, or from getting things that you need?: No   Physical Activity: Unknown (1/24/2025)    Exercise Vital Sign      Days of Exercise per Week: 6 days      Minutes of Exercise per Session: Not on file   Stress: No Stress Concern Present (1/24/2025)    [employer] [place] [employer] - Occupational Stress Questionnaire      Feeling of Stress : Only a little   Social Connections: Unknown (1/24/2025)    Social Connection and Isolation Panel [NHANES]      Frequency of Communication with Friends and Family: Not on file      Frequency of Social Gatherings with Friends and Family: More than three times a week      Attends Jewish Services: Not on file      Active Member of Clubs or Organizations: Not on file      Attends Club or Organization Meetings: Not on file      Marital Status: Not on file   Interpersonal Safety: Low Risk  (1/24/2025)    Interpersonal Safety      Do you feel physically and emotionally safe where you currently live?: Yes      Within the past 12  months, have you been hit, slapped, kicked or otherwise physically hurt by someone?: No      Within the past 12 months, have you been humiliated or emotionally abused in other ways by your partner or ex-partner?: No   Housing Stability: Low Risk  (1/24/2025)    Housing Stability      Do you have housing? : Yes      Are you worried about losing your housing?: No       Due to the nature of this video visit, no physical exam was performed.     DIAGNOSIS: Request for sterilization    PLAN: The risks of the procedure as well as expectations for recovery and outcomes were explained in detail to him.  He was counseled on the risks for bleeding infection and pain after the procedure. We discussed the risk of post-vasectomy pain syndrome.  He was instructed to continue to use contraception until he had proven azoospermia on a semen specimen.  This would normally be collected at least 3 months after the procedure. Also discussed the rare, but possible risk of re-canalization of the vas, even after successful vasectomy with sterile semen specimen.  He was instructed to hold all anticoagulants medications for one week prior to the procedure.  It was recommended that he have someone else drive him home after his vasectomy.  In light of these risks and expectations he would like to proceed.  We are scheduling a vasectomy in the office in the near future.    Pt. Understands:  -1/1000-1/3000 risk of future pregnancy even with perfectly done vasectomy  -vasectomy is a permanent procedure    -he may cryopreserve sperm if he wishes   -1-5% risk of post-vasectomy pain syndrome   -1-5% risk of complication, primarily infection or bleeding  - he needs to have a semen sample that shows no sperm before getting approval for unprotected intercourse.      He would like to take 5 mg Valium pre-procedure. He will pick this medication up from his local pharmacy and arrive to clinic 1 hour prior to his vasectomy to sign the consent, then take the  medication in our waiting area. He understands he must have a .     Thank you for the kind consultation.    15 minutes spent on the date of the encounter, including direct interaction with the patient, performing chart review, documentation and further activities as noted above.    Video-Visit Details  The patient consents to today's video visit: yes    Type of service:  Video Visit    Video Start Time:  4:31 PM    Video End Time:4:42 PM    Originating Location (pt. Location): Home    Distant Location (provider location):  [facility]    Platform used for Video Visit: [facility]    Edwige Rome MD   Urology  [facility]  Clinic Phone [phone]      Again, thank you for allowing me to participate in the care of your patient.      Sincerely,    Edwige Rome MD

## 2025-02-09 PROBLEM — Z30.2 ENCOUNTER FOR VASECTOMY: Status: ACTIVE | Noted: 2025-02-09

## 2025-02-09 PROBLEM — F41.9 ANXIETY DUE TO INVASIVE PROCEDURE: Status: ACTIVE | Noted: 2025-02-09

## 2025-02-09 RX ORDER — DIAZEPAM 5 MG/1
5 TABLET ORAL ONCE
Qty: 1 TABLET | Refills: 0 | Status: SHIPPED | OUTPATIENT
Start: 2025-02-09 | End: 2025-02-09

## 2025-05-12 ENCOUNTER — OFFICE VISIT (OUTPATIENT)
Dept: UROLOGY | Facility: CLINIC | Age: 35
End: 2025-05-12
Payer: COMMERCIAL

## 2025-05-12 VITALS
SYSTOLIC BLOOD PRESSURE: 136 MMHG | DIASTOLIC BLOOD PRESSURE: 88 MMHG | HEIGHT: 70 IN | BODY MASS INDEX: 28.35 KG/M2 | HEART RATE: 74 BPM | WEIGHT: 198 LBS | OXYGEN SATURATION: 99 %

## 2025-05-12 DIAGNOSIS — Z30.2 ENCOUNTER FOR STERILIZATION: ICD-10-CM

## 2025-05-12 DIAGNOSIS — Z30.2 ENCOUNTER FOR VASECTOMY: Primary | ICD-10-CM

## 2025-05-12 PROCEDURE — 88302 TISSUE EXAM BY PATHOLOGIST: CPT | Performed by: PATHOLOGY

## 2025-05-12 PROCEDURE — 1126F AMNT PAIN NOTED NONE PRSNT: CPT | Performed by: STUDENT IN AN ORGANIZED HEALTH CARE EDUCATION/TRAINING PROGRAM

## 2025-05-12 PROCEDURE — 3075F SYST BP GE 130 - 139MM HG: CPT | Performed by: STUDENT IN AN ORGANIZED HEALTH CARE EDUCATION/TRAINING PROGRAM

## 2025-05-12 PROCEDURE — 3079F DIAST BP 80-89 MM HG: CPT | Performed by: STUDENT IN AN ORGANIZED HEALTH CARE EDUCATION/TRAINING PROGRAM

## 2025-05-12 PROCEDURE — 55250 REMOVAL OF SPERM DUCT(S): CPT | Performed by: STUDENT IN AN ORGANIZED HEALTH CARE EDUCATION/TRAINING PROGRAM

## 2025-05-12 RX ORDER — LIDOCAINE HYDROCHLORIDE 10 MG/ML
20 INJECTION, SOLUTION EPIDURAL; INFILTRATION; INTRACAUDAL; PERINEURAL ONCE
Status: COMPLETED | OUTPATIENT
Start: 2025-05-12 | End: 2025-05-12

## 2025-05-12 RX ADMIN — LIDOCAINE HYDROCHLORIDE 20 ML: 10 INJECTION, SOLUTION EPIDURAL; INFILTRATION; INTRACAUDAL; PERINEURAL at 14:14

## 2025-05-12 ASSESSMENT — PAIN SCALES - GENERAL: PAINLEVEL_OUTOF10: NO PAIN (0)

## 2025-05-12 NOTE — NURSING NOTE
The following medication was given:     MEDICATION:  Lidocaine 1% Soln  ROUTE: infiltration  SITE: R and L vas deferens  DOSE: 200mg/20mL  LOT #: [id number]  : Craig  EXPIRATION DATE: 2026-may-31  NDC#: 2795-8423-06   Was there drug waste? Yes  Amount of drug waste (mL): 5.  Reason for waste:  Single use vial  Multi-dose vial: No    DANIELLE Guzman CMA

## 2025-05-12 NOTE — PROGRESS NOTES
OFFICE VASECTOMY OPERATIVE NOTE  [facility]    DATE: 05/12/25  PATIENT: Johnson Ayon    YOB: 1990    Johnson Ayon is a 34 year old male.  He has 2 children and he wishes a vasectomy for birth control.  He has read the brochure and he has shaved himself.  I reviewed the vasectomy procedure with him explaining that it would be done with a local anesthetic given just in the location where the vasectomy would be done.  It would be done through incisions with the removal of segments of the vasa, cauterization of the ends, and burying the ends separate with sutures.      Pt. Understands:  -1/1000-1/3000 risk of future pregnancy even with perfectly done vasectomy  -vasectomy is a permanent procedure    -he may cryopreserve sperm if he wishes   -1-5% risk of post-vasectomy pain syndrome   -1-5% risk of complication, primarily infection or bleeding  - he needs to have a semen sample that shows no sperm before getting approval for unprotected intercourse.      Complications such as bleeding, infection, and damage to other tissues in the area were discussed. I recommended that an ice bag be placed on the scrotum off and on tonight to help reduce pain and swelling.      He was reminded that he was not sterile immediately after the vasectomy that it would take at least 20 ejaculations to empty the vas of any remaining sperm.  He was not to provide a semen sample until after the 20th ejaculation and not before 12 weeks after the vas. He was  to fulfill both of those requirements.   He understands it is his responsibility to find out the results of the vas before proceeding with intercourse without birth control protection.  Other items discussed were activity afterwards, returning to work, voluntary physical activity,  resuming sexual activity, clothing to wear, bathing, and care of the vas site and expected changes in the site as healing progresses.  After signing the permit, bilateral  vasectomy was done as described below.     ANESTHESIA: Local    DETAILS OF PROCEDURE: The risks of the procedure were explained in detail to the patient and informed consent was obtained. The patient was placed supine on the procedure table and the penis and scrotum were prepped and draped in the standard sterile fashion. The right vas deferens was isolated and brought up to the skin. 1% lidocaine local anesthesia was used to infiltrate the skin and the spermatic cord. A small incision was created and adventitial tissues were swept away from the vas. A 1 cm segment of the vas was excised and sent for pathology. The proximal and distal lumina of the vas were cauterized and then each segment was tied off in a knuckling-fashion with a 3-0 vicryl suture. Hemostasis was ensured and the segments were released back into the scrotum. Meticulous hemostasis was achieved. The skin was closed with 3-0 chromic suture in a horizontal mattress fashion. Next the left vas was brought to the skin and a vasectomy was performed in the similar fashion. The skin was closed with 3-0 chromic suture in a horizontal mattress fashion.    COMPLICATIONS: None    TAKE HOME MEDICATIONS: Tylenol and Ibuprofen, every 3 hours, PRN    DISMISSAL INSTRUCTIONS:  - Ice pack to scrotum 15 to 20 minutes each hour awake for 36 to 40 hours.  - No strenuous activity or ejaculation for at least 7 days.  - No unprotected sexual activity until proven azoospermia on semen samples at 3 months.  - Referred to patient handout for normal postop expectations and indications to contact nurse or physician.    M.D.: Edwige Rome MD

## 2025-05-12 NOTE — PATIENT INSTRUCTIONS
POST VASECTOMY INSTRUCTIONS    1.) If you have any concerns or questions, please contact our office at [phone] during office hours. If it is after hours, please call [phone].     2.) It is okay to take a shower, however, do not soak in water (bath,swimming, hot tub,etc....) until your incision is healed.    3.) You might notice some swelling, mild bruising, and discomfort for several days after your vasectomy. This is to be expected. For at least the next 24 hours, an ice pack should be applied for 20 minutes every hour that you are awake. Ice will help with discomfort and swelling. Do not place directly on the skin.    4.) No intercourse, strenuous activity or exercise for at least 7-10 days, even if you feel fine.    5.) You need to wear good scrotal support while you are healing. We strongly recommend an athletic supporter or a pair of regular briefs that are one size too small. Boxer briefs do not offer enough support.    6.) Use Motrin, Tylenol or Ibuprofen as directed on the bottle for discomfort.     7.) It is normal to have mild drainage from the incision area for several days. However, please contact our office if you notice: bright red blood that does not stop after three days, increased pain, heat at the incision, red streaks, foul smelling discharge, or if you start to run a fever.     8.) YOU MUST CONTINUE BIRTH CONTROL UNTIL WE CONFIRM YOUR STERILITY.  This process can take up to a year to complete (rare occurrence).     9.) You have been given a form with specimen cup and instructions for your follow up specimen. You will be cleared once we receive ONE negative specimen. If your specimen comes back positive (sperm seen) you will be asked to repeat the test. This does not mean that your vasectomy has failed.

## 2025-05-12 NOTE — NURSING NOTE
Chief Complaint   Patient presents with    Sterilization     Patient is here for Vasectomy      Patient has signed the consent form stating that we will be doing a bilateral vasectomy today and that this is the correct procedure.  I verbally confirmed the patient's identity using two indicators, relevant allergies, and that the correct equipment was available. Patient was cleaned and prepped according to the appropriate policy.  Equipment was prepped in a sterile fashion and MD was informed that patient was ready.    Consent read and signed: Yes  Aspirin/blood thinning products stopped 7 days prior to procedure: Yes  Allergies   Allergen Reactions    Eggs [Chicken-Derived Products (Egg)] Nausea and Vomiting    Peanuts [Nuts] GI Disturbance       Physician performed procedure.  After the procedure the patient was instructed to wait approximately three months and at least 30 ejaculations prior to returning a sample to confirm sterility.  The patient was instructed to bring in sample within 3-6 hours post sample ejaculation.  Any additional medications after the procedure were sent to the patient's pharmacy and instructions were given according to company protocol and the performing physician.  The physician performing the procedure prescribed as they felt appropriate.  Patient was also instructed to avoid heavy lifting or strenuous activity for 10-14 days and to ice the scrotum.  Samples from the R and L vas deferens were sent to the lab and an order was placed for future semen analysis.       Barbara Murdock LPN

## 2025-05-12 NOTE — LETTER
5/12/2025       RE: Johnson Ayon  [address] [facility] [address]     Dear Colleague,    Thank you for referring your patient, Johnson Ayon, to the [facility] [place] at [facility]. Please see a copy of my visit note below.    OFFICE VASECTOMY OPERATIVE NOTE  [facility]    DATE: 05/12/25  PATIENT: Johnson Ayon    YOB: 1990    Johnson Ayon is a 34 year old male.  He has 2 children and he wishes a vasectomy for birth control.  He has read the brochure and he has shaved himself.  I reviewed the vasectomy procedure with him explaining that it would be done with a local anesthetic given just in the location where the vasectomy would be done.  It would be done through incisions with the removal of segments of the vasa, cauterization of the ends, and burying the ends separate with sutures.      Pt. Understands:  -1/1000-1/3000 risk of future pregnancy even with perfectly done vasectomy  -vasectomy is a permanent procedure    -he may cryopreserve sperm if he wishes   -1-5% risk of post-vasectomy pain syndrome   -1-5% risk of complication, primarily infection or bleeding  - he needs to have a semen sample that shows no sperm before getting approval for unprotected intercourse.      Complications such as bleeding, infection, and damage to other tissues in the area were discussed. I recommended that an ice bag be placed on the scrotum off and on tonight to help reduce pain and swelling.      He was reminded that he was not sterile immediately after the vasectomy that it would take at least 20 ejaculations to empty the vas of any remaining sperm.  He was not to provide a semen sample until after the 20th ejaculation and not before 12 weeks after the vas. He was  to fulfill both of those requirements.   He understands it is his responsibility to find out the results of the vas before proceeding with  intercourse without birth control protection.  Other items discussed were activity afterwards, returning to work, voluntary physical activity,  resuming sexual activity, clothing to wear, bathing, and care of the vas site and expected changes in the site as healing progresses.  After signing the permit, bilateral vasectomy was done as described below.     ANESTHESIA: Local    DETAILS OF PROCEDURE: The risks of the procedure were explained in detail to the patient and informed consent was obtained. The patient was placed supine on the procedure table and the penis and scrotum were prepped and draped in the standard sterile fashion. The right vas deferens was isolated and brought up to the skin. 1% lidocaine local anesthesia was used to infiltrate the skin and the spermatic cord. A small incision was created and adventitial tissues were swept away from the vas. A 1 cm segment of the vas was excised and sent for pathology. The proximal and distal lumina of the vas were cauterized and then each segment was tied off in a knuckling-fashion with a 3-0 vicryl suture. Hemostasis was ensured and the segments were released back into the scrotum. Meticulous hemostasis was achieved. The skin was closed with 3-0 chromic suture in a horizontal mattress fashion. Next the left vas was brought to the skin and a vasectomy was performed in the similar fashion. The skin was closed with 3-0 chromic suture in a horizontal mattress fashion.    COMPLICATIONS: None    TAKE HOME MEDICATIONS: Tylenol and Ibuprofen, every 3 hours, PRN    DISMISSAL INSTRUCTIONS:  - Ice pack to scrotum 15 to 20 minutes each hour awake for 36 to 40 hours.  - No strenuous activity or ejaculation for at least 7 days.  - No unprotected sexual activity until proven azoospermia on semen samples at 3 months.  - Referred to patient handout for normal postop expectations and indications to contact nurse or physician.    HUGOD.: Edwige Rome MD        Again, thank you  for allowing me to participate in the care of your patient.      Sincerely,    Edwige Rome MD

## 2025-05-14 LAB
PATH REPORT.COMMENTS IMP SPEC: NORMAL
PATH REPORT.COMMENTS IMP SPEC: NORMAL
PATH REPORT.FINAL DX SPEC: NORMAL
PATH REPORT.GROSS SPEC: NORMAL
PATH REPORT.MICROSCOPIC SPEC OTHER STN: NORMAL
PATH REPORT.RELEVANT HX SPEC: NORMAL
PHOTO IMAGE: NORMAL